# Patient Record
Sex: FEMALE | Race: WHITE | URBAN - METROPOLITAN AREA
[De-identification: names, ages, dates, MRNs, and addresses within clinical notes are randomized per-mention and may not be internally consistent; named-entity substitution may affect disease eponyms.]

---

## 2017-12-19 ENCOUNTER — APPOINTMENT (OUTPATIENT)
Dept: ULTRASOUND IMAGING | Facility: CLINIC | Age: 52
End: 2017-12-19
Attending: EMERGENCY MEDICINE
Payer: COMMERCIAL

## 2017-12-19 ENCOUNTER — HOSPITAL ENCOUNTER (INPATIENT)
Facility: CLINIC | Age: 52
LOS: 3 days | Discharge: HOME | End: 2017-12-22
Attending: EMERGENCY MEDICINE | Admitting: INTERNAL MEDICINE
Payer: COMMERCIAL

## 2017-12-19 DIAGNOSIS — K80.42 CHOLEDOCHOLITHIASIS WITH ACUTE CHOLECYSTITIS: ICD-10-CM

## 2017-12-19 PROBLEM — K81.9 CHOLECYSTITIS: Status: ACTIVE | Noted: 2017-12-19

## 2017-12-19 LAB
ALBUMIN SERPL-MCNC: 3.9 G/DL (ref 3.4–5)
ALP SERPL-CCNC: 254 U/L (ref 40–150)
ALT SERPL W P-5'-P-CCNC: 1228 U/L (ref 0–50)
ANION GAP SERPL CALCULATED.3IONS-SCNC: 11 MMOL/L (ref 3–14)
AST SERPL W P-5'-P-CCNC: 578 U/L (ref 0–45)
BASOPHILS # BLD AUTO: 0 10E9/L (ref 0–0.2)
BASOPHILS NFR BLD AUTO: 0.3 %
BILIRUB SERPL-MCNC: 6.1 MG/DL (ref 0.2–1.3)
BUN SERPL-MCNC: 7 MG/DL (ref 7–30)
CALCIUM SERPL-MCNC: 9.7 MG/DL (ref 8.5–10.1)
CHLORIDE SERPL-SCNC: 98 MMOL/L (ref 94–109)
CO2 SERPL-SCNC: 25 MMOL/L (ref 20–32)
CREAT SERPL-MCNC: 0.49 MG/DL (ref 0.52–1.04)
DIFFERENTIAL METHOD BLD: ABNORMAL
EOSINOPHIL # BLD AUTO: 0.1 10E9/L (ref 0–0.7)
EOSINOPHIL NFR BLD AUTO: 0.4 %
ERYTHROCYTE [DISTWIDTH] IN BLOOD BY AUTOMATED COUNT: 14.7 % (ref 10–15)
GFR SERPL CREATININE-BSD FRML MDRD: >90 ML/MIN/1.7M2
GLUCOSE BLDC GLUCOMTR-MCNC: 70 MG/DL (ref 70–99)
GLUCOSE SERPL-MCNC: 84 MG/DL (ref 70–99)
HCT VFR BLD AUTO: 45.4 % (ref 35–47)
HGB BLD-MCNC: 15 G/DL (ref 11.7–15.7)
IMM GRANULOCYTES # BLD: 0.1 10E9/L (ref 0–0.4)
IMM GRANULOCYTES NFR BLD: 0.4 %
LIPASE SERPL-CCNC: 175 U/L (ref 73–393)
LYMPHOCYTES # BLD AUTO: 0.6 10E9/L (ref 0.8–5.3)
LYMPHOCYTES NFR BLD AUTO: 5 %
MCH RBC QN AUTO: 27.4 PG (ref 26.5–33)
MCHC RBC AUTO-ENTMCNC: 33 G/DL (ref 31.5–36.5)
MCV RBC AUTO: 83 FL (ref 78–100)
MONOCYTES # BLD AUTO: 1 10E9/L (ref 0–1.3)
MONOCYTES NFR BLD AUTO: 8.5 %
NEUTROPHILS # BLD AUTO: 9.9 10E9/L (ref 1.6–8.3)
NEUTROPHILS NFR BLD AUTO: 85.4 %
PLATELET # BLD AUTO: 273 10E9/L (ref 150–450)
PLATELET # BLD AUTO: 310 10E9/L (ref 150–450)
POTASSIUM SERPL-SCNC: 3.7 MMOL/L (ref 3.4–5.3)
PROT SERPL-MCNC: 8 G/DL (ref 6.8–8.8)
RBC # BLD AUTO: 5.47 10E12/L (ref 3.8–5.2)
SODIUM SERPL-SCNC: 134 MMOL/L (ref 133–144)
WBC # BLD AUTO: 11.6 10E9/L (ref 4–11)

## 2017-12-19 PROCEDURE — 96374 THER/PROPH/DIAG INJ IV PUSH: CPT

## 2017-12-19 PROCEDURE — 96361 HYDRATE IV INFUSION ADD-ON: CPT

## 2017-12-19 PROCEDURE — 99285 EMERGENCY DEPT VISIT HI MDM: CPT | Mod: 25

## 2017-12-19 PROCEDURE — 12000000 ZZH R&B MED SURG/OB

## 2017-12-19 PROCEDURE — 83690 ASSAY OF LIPASE: CPT | Performed by: EMERGENCY MEDICINE

## 2017-12-19 PROCEDURE — 76705 ECHO EXAM OF ABDOMEN: CPT

## 2017-12-19 PROCEDURE — 80053 COMPREHEN METABOLIC PANEL: CPT | Performed by: EMERGENCY MEDICINE

## 2017-12-19 PROCEDURE — 00000146 ZZHCL STATISTIC GLUCOSE BY METER IP

## 2017-12-19 PROCEDURE — 96375 TX/PRO/DX INJ NEW DRUG ADDON: CPT

## 2017-12-19 PROCEDURE — 85049 AUTOMATED PLATELET COUNT: CPT | Performed by: INTERNAL MEDICINE

## 2017-12-19 PROCEDURE — 36415 COLL VENOUS BLD VENIPUNCTURE: CPT | Performed by: INTERNAL MEDICINE

## 2017-12-19 PROCEDURE — 25000128 H RX IP 250 OP 636: Performed by: INTERNAL MEDICINE

## 2017-12-19 PROCEDURE — 96376 TX/PRO/DX INJ SAME DRUG ADON: CPT

## 2017-12-19 PROCEDURE — 25000128 H RX IP 250 OP 636: Performed by: EMERGENCY MEDICINE

## 2017-12-19 PROCEDURE — 85025 COMPLETE CBC W/AUTO DIFF WBC: CPT | Performed by: EMERGENCY MEDICINE

## 2017-12-19 PROCEDURE — 99222 1ST HOSP IP/OBS MODERATE 55: CPT | Mod: AI | Performed by: INTERNAL MEDICINE

## 2017-12-19 RX ORDER — LIDOCAINE 40 MG/G
CREAM TOPICAL
Status: DISCONTINUED | OUTPATIENT
Start: 2017-12-19 | End: 2017-12-20 | Stop reason: HOSPADM

## 2017-12-19 RX ORDER — AMOXICILLIN 250 MG
1 CAPSULE ORAL 2 TIMES DAILY PRN
Status: DISCONTINUED | OUTPATIENT
Start: 2017-12-19 | End: 2017-12-22 | Stop reason: HOSPADM

## 2017-12-19 RX ORDER — MORPHINE SULFATE 4 MG/ML
4 INJECTION, SOLUTION INTRAMUSCULAR; INTRAVENOUS
Status: COMPLETED | OUTPATIENT
Start: 2017-12-19 | End: 2017-12-19

## 2017-12-19 RX ORDER — PROCHLORPERAZINE 25 MG
25 SUPPOSITORY, RECTAL RECTAL EVERY 12 HOURS PRN
Status: DISCONTINUED | OUTPATIENT
Start: 2017-12-19 | End: 2017-12-22 | Stop reason: HOSPADM

## 2017-12-19 RX ORDER — POTASSIUM CHLORIDE 29.8 MG/ML
20 INJECTION INTRAVENOUS
Status: DISCONTINUED | OUTPATIENT
Start: 2017-12-19 | End: 2017-12-19 | Stop reason: RX

## 2017-12-19 RX ORDER — INDOMETHACIN 50 MG/1
100 SUPPOSITORY RECTAL
Status: DISCONTINUED | OUTPATIENT
Start: 2017-12-19 | End: 2017-12-20 | Stop reason: HOSPADM

## 2017-12-19 RX ORDER — NALOXONE HYDROCHLORIDE 0.4 MG/ML
.1-.4 INJECTION, SOLUTION INTRAMUSCULAR; INTRAVENOUS; SUBCUTANEOUS
Status: DISCONTINUED | OUTPATIENT
Start: 2017-12-19 | End: 2017-12-22

## 2017-12-19 RX ORDER — ONDANSETRON 4 MG/1
4 TABLET, ORALLY DISINTEGRATING ORAL EVERY 6 HOURS PRN
Status: DISCONTINUED | OUTPATIENT
Start: 2017-12-19 | End: 2017-12-22 | Stop reason: HOSPADM

## 2017-12-19 RX ORDER — HYDROMORPHONE HYDROCHLORIDE 1 MG/ML
.3-.5 INJECTION, SOLUTION INTRAMUSCULAR; INTRAVENOUS; SUBCUTANEOUS
Status: DISCONTINUED | OUTPATIENT
Start: 2017-12-19 | End: 2017-12-22 | Stop reason: HOSPADM

## 2017-12-19 RX ORDER — SODIUM CHLORIDE 9 MG/ML
1000 INJECTION, SOLUTION INTRAVENOUS CONTINUOUS
Status: DISCONTINUED | OUTPATIENT
Start: 2017-12-19 | End: 2017-12-19

## 2017-12-19 RX ORDER — ONDANSETRON 2 MG/ML
4 INJECTION INTRAMUSCULAR; INTRAVENOUS EVERY 6 HOURS PRN
Status: DISCONTINUED | OUTPATIENT
Start: 2017-12-19 | End: 2017-12-22 | Stop reason: HOSPADM

## 2017-12-19 RX ORDER — MORPHINE SULFATE 4 MG/ML
4 INJECTION, SOLUTION INTRAMUSCULAR; INTRAVENOUS
Status: DISCONTINUED | OUTPATIENT
Start: 2017-12-19 | End: 2017-12-19

## 2017-12-19 RX ORDER — POTASSIUM CL/LIDO/0.9 % NACL 10MEQ/0.1L
10 INTRAVENOUS SOLUTION, PIGGYBACK (ML) INTRAVENOUS
Status: DISCONTINUED | OUTPATIENT
Start: 2017-12-19 | End: 2017-12-22 | Stop reason: HOSPADM

## 2017-12-19 RX ORDER — ONDANSETRON 2 MG/ML
4 INJECTION INTRAMUSCULAR; INTRAVENOUS EVERY 30 MIN PRN
Status: DISCONTINUED | OUTPATIENT
Start: 2017-12-19 | End: 2017-12-19

## 2017-12-19 RX ORDER — OXYCODONE HYDROCHLORIDE 5 MG/1
5-10 TABLET ORAL
Status: DISCONTINUED | OUTPATIENT
Start: 2017-12-19 | End: 2017-12-22 | Stop reason: HOSPADM

## 2017-12-19 RX ORDER — CALCIUM CARBONATE 500 MG/1
1-2 TABLET, CHEWABLE ORAL 3 TIMES DAILY PRN
COMMUNITY
End: 2018-03-28

## 2017-12-19 RX ORDER — DIPHENHYDRAMINE HCL 25 MG
25 CAPSULE ORAL EVERY 6 HOURS PRN
Status: DISCONTINUED | OUTPATIENT
Start: 2017-12-19 | End: 2017-12-22 | Stop reason: HOSPADM

## 2017-12-19 RX ORDER — SODIUM CHLORIDE 9 MG/ML
INJECTION, SOLUTION INTRAVENOUS CONTINUOUS
Status: DISCONTINUED | OUTPATIENT
Start: 2017-12-19 | End: 2017-12-20

## 2017-12-19 RX ORDER — POTASSIUM CHLORIDE 1.5 G/1.58G
20-40 POWDER, FOR SOLUTION ORAL
Status: DISCONTINUED | OUTPATIENT
Start: 2017-12-19 | End: 2017-12-22 | Stop reason: HOSPADM

## 2017-12-19 RX ORDER — DIPHENHYDRAMINE HYDROCHLORIDE 50 MG/ML
25 INJECTION INTRAMUSCULAR; INTRAVENOUS ONCE
Status: COMPLETED | OUTPATIENT
Start: 2017-12-19 | End: 2017-12-19

## 2017-12-19 RX ORDER — HYDROMORPHONE HYDROCHLORIDE 1 MG/ML
0.5 INJECTION, SOLUTION INTRAMUSCULAR; INTRAVENOUS; SUBCUTANEOUS
Status: DISCONTINUED | OUTPATIENT
Start: 2017-12-19 | End: 2017-12-19 | Stop reason: CLARIF

## 2017-12-19 RX ORDER — POTASSIUM CHLORIDE 7.45 MG/ML
10 INJECTION INTRAVENOUS
Status: DISCONTINUED | OUTPATIENT
Start: 2017-12-19 | End: 2017-12-22 | Stop reason: HOSPADM

## 2017-12-19 RX ORDER — ACETAMINOPHEN 325 MG/1
650 TABLET ORAL EVERY 4 HOURS PRN
Status: DISCONTINUED | OUTPATIENT
Start: 2017-12-19 | End: 2017-12-22 | Stop reason: HOSPADM

## 2017-12-19 RX ORDER — DIPHENHYDRAMINE HYDROCHLORIDE 50 MG/ML
25 INJECTION INTRAMUSCULAR; INTRAVENOUS EVERY 6 HOURS PRN
Status: DISCONTINUED | OUTPATIENT
Start: 2017-12-19 | End: 2017-12-22 | Stop reason: HOSPADM

## 2017-12-19 RX ORDER — PROCHLORPERAZINE MALEATE 5 MG
10 TABLET ORAL EVERY 6 HOURS PRN
Status: DISCONTINUED | OUTPATIENT
Start: 2017-12-19 | End: 2017-12-22 | Stop reason: HOSPADM

## 2017-12-19 RX ORDER — AMOXICILLIN 250 MG
2 CAPSULE ORAL 2 TIMES DAILY PRN
Status: DISCONTINUED | OUTPATIENT
Start: 2017-12-19 | End: 2017-12-22 | Stop reason: HOSPADM

## 2017-12-19 RX ORDER — POTASSIUM CHLORIDE 1500 MG/1
20-40 TABLET, EXTENDED RELEASE ORAL
Status: DISCONTINUED | OUTPATIENT
Start: 2017-12-19 | End: 2017-12-22 | Stop reason: HOSPADM

## 2017-12-19 RX ADMIN — MORPHINE SULFATE 4 MG: 4 INJECTION, SOLUTION INTRAMUSCULAR; INTRAVENOUS at 16:44

## 2017-12-19 RX ADMIN — DIPHENHYDRAMINE HYDROCHLORIDE 25 MG: 50 INJECTION, SOLUTION INTRAMUSCULAR; INTRAVENOUS at 18:49

## 2017-12-19 RX ADMIN — SODIUM CHLORIDE: 9 INJECTION, SOLUTION INTRAVENOUS at 20:51

## 2017-12-19 RX ADMIN — MORPHINE SULFATE 4 MG: 4 INJECTION, SOLUTION INTRAMUSCULAR; INTRAVENOUS at 17:55

## 2017-12-19 RX ADMIN — SODIUM CHLORIDE 1000 ML: 9 INJECTION, SOLUTION INTRAVENOUS at 16:20

## 2017-12-19 RX ADMIN — Medication 0.5 MG: at 20:49

## 2017-12-19 RX ADMIN — ONDANSETRON 4 MG: 2 INJECTION INTRAMUSCULAR; INTRAVENOUS at 16:21

## 2017-12-19 RX ADMIN — MORPHINE SULFATE 4 MG: 4 INJECTION, SOLUTION INTRAMUSCULAR; INTRAVENOUS at 16:21

## 2017-12-19 RX ADMIN — ONDANSETRON 4 MG: 2 INJECTION INTRAMUSCULAR; INTRAVENOUS at 20:59

## 2017-12-19 ASSESSMENT — ENCOUNTER SYMPTOMS
ABDOMINAL PAIN: 1
VOMITING: 1
NAUSEA: 1
CONSTIPATION: 1
FEVER: 0

## 2017-12-19 NOTE — IP AVS SNAPSHOT
[facility] [facility]    [address] [place] [address]    Phone:  [phone]                                       After Visit Summary   12/19/2017    Humera Grey    MRN: [id number]           After Visit Summary Signature Page     I have received my discharge instructions, and my questions have been answered. I have discussed any challenges I see with this plan with the nurse or doctor.    ..........................................................................................................................................  Patient/Patient Representative Signature      ..........................................................................................................................................  Patient Representative Print Name and Relationship to Patient    ..................................................               ................................................  Date                                            Time    ..........................................................................................................................................  Reviewed by Signature/Title    ...................................................              ..............................................  Date                                                            Time

## 2017-12-19 NOTE — ED NOTES
DATE:  12/19/2017   TIME OF RECEIPT FROM LAB:  3208  LAB TEST:  AST AND ALT     LAB VALUE:  578  And 1228  RESULTS GIVEN WITH READ-BACK TO (PROVIDER):  Data UnavailableDr Bergenstal  TIME LAB VALUE REPORTED TO PROVIDER:   1701

## 2017-12-19 NOTE — ED PROVIDER NOTES
History     Chief Complaint:  Abdominal Pain    HPI   Humera Grey is a 52 year old female who presents after a positive diagnosis of gall stones on 12/18 at her clinic and continued uncontrolled abdominal pain today. The patient reports that she followed up with GI, who scheduled the procedure as an outpatient; however, the patient presents to the ED today for uncontrolled abdominal pain at a severity of 7/10 and one episode of vomiting. She denies fever. The patient notes decreased bowel movements with her last stool 4 days ago but states that this has been chronic over the past 6 months. She notes no provocation or palliation of her pain with food, although she notes only having a quarter cup of soup and water today.    Clinic CT 12/18/17  CT-scan Abdomen/Pelvis w/o contrast:  1. Abnormal appearance of the gallbladder, likely related to sludge and multiple noncalcified gallstones. No appreciable pericholecystic edema, although acute cholecystitis is not excluded. The CBD is mildly prominent, which is a nonspecific finding. Ultrasound may be of further benefit to evaluate these findings.  2.  Markedly decreased attenuation of the liver parenchyma, consistent with fatty infiltration.  3. Incidental findings as described above.  Preliminary result per radiology.      Laboratory:  ALKPHOS 180  ALT 1208  AST: 844    Allergies:  Sertraline      Medications:    The patient is currently on no regular medications.      Past Medical History:    Depression  GERD    Past Surgical History:    Cystectomy ovarian  Mastectomy  Toe surgery    Family History:    History review. No contributing family history.     Social History:  Smoking status: no  Alcohol use: yes   PCP: No primary care provider on file.   Patient presents with .   Marital Status:       Review of Systems   Constitutional: Negative for fever.   Gastrointestinal: Positive for abdominal pain, constipation (chronic), nausea and vomiting.   All  other systems reviewed and are negative.    Physical Exam     Patient Vitals for the past 24 hrs:   BP Temp Temp src Pulse SpO2 Height Weight   12/19/17 1744 142/80 - - - 96 % - -   12/19/17 1730 - - - - 95 % - -   12/19/17 1645 174/83 - - - - - -   12/19/17 1630 - - - - 98 % - -   12/19/17 1540 (!) 175/99 97.8  F (36.6  C) Oral 99 100 % 1.524 m (5') 62.6 kg (138 lb)     Physical Exam  General: Appears well-developed and well-nourished.   Head: No signs of trauma.   CV: Normal rate and regular rhythm.    Resp: Effort normal and breath sounds normal. No respiratory distress.   GI: Soft. There is RUQ tenderness without rebound or guarding.  Normal bowel sounds.  No CVA tenderness.  MSK: Normal range of motion. no edema. No Calf tenderness.  Neuro: The patient is alert and oriented.  Speech normal.  GCS 15  Skin: Skin is warm and dry. No rash noted.   Psych: normal mood and affect. behavior is normal.       Emergency Department Course     Imaging:  Radiographic findings were communicated with the patient who voiced understanding of the findings.    US Abdomen RUQ:  1. Cholelithiasis filling the gallbladder. There is gallbladder wall  thickness upper normal at 0.3 cm. Positive sonographic Jules's sign.  With tenderness over the gallbladder, early cholecystitis cannot be  excluded although gallbladder wall is only borderline thickened.  2. Prominent 0.7 cm common hepatic duct.  3. Fatty infiltration of the liver.  As read by radiology    Laboratory:  CBC: WBC 11.6 (H), o/w WNL (HGB 15.0,  )   CMP: Creatinine 0.49 (L), Bilirubin 6.1 (H), ALKPHOS 254 (H), ALT 1228 (HH),  (HH), o/w WNL  Lipase: 175    Interventions:  1620: NS 1L IV Bolus   1621: Zofran 4mg IV   1621: Morphine 4 mg IV   1755: Morphine 4 mg IV  1849: Benadryl 25 mg IV    Emergency Department Course:  Past medical records, nursing notes, and vitals reviewed.  1610: I performed an exam of the patient and obtained history, as documented above. GCS  15.   The patient was taken for US, see imaging results above.    IV inserted and blood drawn.   1730: I discussed the patient with SHANIA Johnson.  Findings and plan explained to the Patient who consents to admission.     1848: Discussed the patient with Dr. Lainez, who will admit the patient to a medical bed for further monitoring, evaluation, and treatment.      Impression & Plan      Medical Decision Making:  Humera Grey is a 52-year-old woman who presents due to abdominal pain.  She has had the pain off and on for some time, but worse the last couple of days.  She had been seen by Dr. Leonard with gastroenterology yesterday who did blood work and a CT, and she was thought to have gallbladder disease.  Given the worsening pain, she came to the ER.  On my evaluation, she did appear uncomfortable and some tenderness of the right upper quadrant.  Blood work was repeated which showed an elevation of her LFTs.  Ultrasound also showed signs of cholecystitis.  Based on her overall picture, believe that she has choledocholithiasis.  Dr. Leonard with GI actually came down and saw the patient in the ER and agreed to do an ERCP in the morning to either treat choledocholithiasis or clear the patient for potential surgery if indicated.  Patient was admitted to the hospitalist service.      Diagnosis:    ICD-10-CM    1. Choledocholithiasis with acute cholecystitis K80.42        Disposition:  Admitted to markos Nix  12/19/2017    EMERGENCY DEPARTMENT  I, Ashly Nix, am serving as a scribe at 4:10 PM on 12/19/2017 to document services personally performed by Oscar Ortiz MD based on my observations and the provider's statements to me.        Oscar Ortiz MD  12/19/17 5313

## 2017-12-19 NOTE — IP AVS SNAPSHOT
MRN:[id number]                      After Visit Summary   12/19/2017    Humera Grey    MRN: [id number]           Thank you!     Thank you for choosing [facility] for your care. Our goal is always to provide you with excellent care. Hearing back from our patients is one way we can continue to improve our services. Please take a few minutes to complete the written survey that you may receive in the mail after you visit with us. Thank you!        Patient Information     Date Of Birth          1965        Designated Caregiver       Most Recent Value    Caregiver    Will someone help with your care after discharge? yes    Name of designated caregiver Justin    Phone number of caregiver [phone]    Caregiver address see Facesheet      About your hospital stay     You were admitted on:  December 19, 2017 You last received care in the:  [facility] [address] [facility]    You were discharged on:  December 22, 2017        Reason for your hospital stay       You had your gallbladder removed            Reason for your hospital stay       You were admitted with abdominal pain and had surgery to remove the gall bladder                  Who to Call     For medical emergencies, please call 911.  For non-urgent questions about your medical care, please call your primary care provider or clinic, [phone]  For questions related to your surgery, please call your surgery clinic        Attending Provider     Provider Specialty    Oscar Ortiz MD --    Yuri Lainez MD Internal Medicine       Primary Care Provider Office Phone # Fax #    Majo Velez [phone]       When to contact your care team       Call your primary doctor if you have any of the following: temperature greater than 101 and having abdominal pain.                  After Care Instructions     Activity       Your activity upon discharge: activity as tolerated and no heavy lifting for 2 weeks             Activity       Your activity upon discharge: activity as tolerated.  Do not drive while taking narcotics            Diet       Follow this diet upon discharge: Regular            Diet       Follow this diet upon discharge: Orders Placed This Encounter      Advance Diet as Tolerated: Regular Diet Adult                  Follow-up Appointments     Follow-up and recommended labs and tests        Follow up with Dr. Brenner or PA, at [address], [place] [address], within 2 weeks, call office for appointment at [phone].    IF you are doing well and have no questions or concerns, you may call our nurse to update on your condition instead of coming in for appointment.            Follow-up and recommended labs and tests        Follow up with primary care provider, JOAN BARBOZA, within 7 days for hospital follow- up.  The following labs/tests are recommended: liver function tests.                  Further instructions from your care team       Aggressive incentives spirometry  Ambulate as tolerated at least 3 to 4 times a day  Repeat ERCP in 3 months to remove stent.       Pending Results     No orders found from 12/17/2017 to 12/20/2017.            Statement of Approval     Ordered          12/22/17 1421  I have reviewed and agree with all the recommendations and orders detailed in this document.  EFFECTIVE NOW     Approved and electronically signed by:  Payton Gorman MD             Admission Information     Date & Time Provider Department Dept. Phone    12/19/2017 Yuri Lainez MD [facility] [address] [facility] [phone]      Your Vitals Were     Blood Pressure Pulse Temperature Respirations Height Weight    132/89 (BP Location: Left arm) 92 98  F (36.7  C) (Oral) 18 1.524 m (5') 62.6 kg (138 lb)    Pulse Oximetry BMI (Body Mass Index)                95% 26.95 kg/m2          MyChart Information     [facility] lets you send messages to your doctor, view your test results,  "renew your prescriptions, schedule appointments and more. To sign up, go to [email or web address] . Click on \"Log in\" on the left side of the screen, which will take you to the Welcome page. Then click on \"Sign up Now\" on the right side of the page.     You will be asked to enter the access code listed below, as well as some personal information. Please follow the directions to create your username and password.     Your access code is: [email or web address]  Expires: 3/22/2018 12:46 PM     Your access code will  in 90 days. If you need help or a new code, please call your [facility] clinic or [phone].        Care EveryWhere ID     This is your Care EveryWhere ID. This could be used by other organizations to access your [facility] medical records  [id number]        Equal Access to Services     DEREK CASTILLO : Shasha Avilez, derrek peng, saira rayo, loyda patton . So [facility] [phone].    ATENCIÓN: Si habla español, tiene a dye disposición servicios gratuitos de asistencia lingüística. Llame al [phone].    We comply with applicable federal civil rights laws and [place] laws. We do not discriminate on the basis of race, color, national origin, age, disability, sex, sexual orientation, or gender identity.               Review of your medicines      START taking        Dose / Directions    bisacodyl 10 MG Suppository   Commonly known as:  DULCOLAX   Used for:  Choledocholithiasis with acute cholecystitis        Dose:  10 mg   Place 1 suppository (10 mg) rectally daily as needed for constipation   Quantity:  1 suppository   Refills:  1       oxyCODONE IR 5 MG tablet   Commonly known as:  ROXICODONE   Used for:  Choledocholithiasis with acute cholecystitis        Dose:  5-10 mg   Take 1-2 tablets (5-10 mg) by mouth every 3 hours as needed for moderate to severe pain   Quantity:  20 tablet   Refills:  0         CONTINUE these medicines which have NOT " CHANGED        Dose / Directions    ADVIL PO        Dose:  200-400 mg   Take 200-400 mg by mouth every 6 hours as needed for moderate pain   Refills:  0       bismuth subsalicylate 262 MG/15ML suspension   Commonly known as:  PEPTO BISMOL        Dose:  15 mL   Take 15 mLs by mouth every 6 hours as needed for indigestion   Refills:  0       calcium carbonate 500 MG chewable tablet   Commonly known as:  TUMS        Dose:  1-2 chew tab   Take 1-2 chew tab by mouth 3 times daily as needed for heartburn   Refills:  0         STOP taking     PREDNISONE PO                Where to get your medicines      These medications were sent to [id number] - [place], [place] - [address]  [address] [facility] [address]     Phone:  [phone]     bisacodyl 10 MG Suppository         Some of these will need a paper prescription and others can be bought over the counter. Ask your nurse if you have questions.     Bring a paper prescription for each of these medications     oxyCODONE IR 5 MG tablet                Protect others around you: Learn how to safely use, store and throw away your medicines at www.disposemymeds.org.             Medication List: This is a list of all your medications and when to take them. Check marks below indicate your daily home schedule. Keep this list as a reference.      Medications           Morning Afternoon Evening Bedtime As Needed    ADVIL PO   Take 200-400 mg by mouth every 6 hours as needed for moderate pain   Next Dose Due:  Start at home when needed for moderated pain.                                 bisacodyl 10 MG Suppository   Commonly known as:  DULCOLAX   Place 1 suppository (10 mg) rectally daily as needed for constipation   Next Dose Due:  If needed for constipation.                                 bismuth subsalicylate 262 MG/15ML suspension   Commonly known as:  PEPTO BISMOL   Take 15 mLs by mouth every 6 hours as needed for indigestion   Next Dose Due:  If needed for  indigestion.                                calcium carbonate 500 MG chewable tablet   Commonly known as:  TUMS   Take 1-2 chew tab by mouth 3 times daily as needed for heartburn   Next Dose Due:  If needed for heartburn.                                oxyCODONE IR 5 MG tablet   Commonly known as:  ROXICODONE   Take 1-2 tablets (5-10 mg) by mouth every 3 hours as needed for moderate to severe pain   Last time this was given:  10 mg on 12/22/2017  6:19 AM   Next Dose Due:  If needed for moderated to severe pain.

## 2017-12-19 NOTE — CONSULTS
[facility]  Gastroenterology Consultation         Humera Grey  [address]  [facility] [address]  52 year old female    Admission Date/Time: 12/19/2017  Primary Care Provider: Majo Velez  Referring / Attending Physician:  Dr. Lainez    We were asked to see the patient in consultation by Dr. Lainez for evaluation of Abdominal pain.        CC: Jaundice    HPI:  Humera Grey is a 52 year old female who was admitted through ED due to sever RUQ abdominal pain with dilated CBD and Multiple gallstones. Patient is tender RUQ area. Significantly elevated LFTs. Patient was seen in the clinic earlier and was sent to ER. Patient has been c/o  abdominal pain off and on with eating for almost 2-3 weeks. Pain became significantly worse yesterday with nausea and vomiting. Patient was evaluated with CT and blood work as mentioned earlier.  No H/O fever, chills, chest pain, SOB.  No H/O travel or exposure to anyone with jaundice.    ROS: A comprehensive ten point review of systems was negative aside from those in mentioned in the HPI.      PAST MED HX:  I have reviewed this patient's medical history and updated it with pertinent information if needed.   Past Medical History:   Diagnosis Date     Depressive disorder      Gastroesophageal reflux disease        MEDICATIONS:   None       ALLERGIES:   Allergies   Allergen Reactions     Sertraline        SOCIAL HISTORY:  Social History   Substance Use Topics     Smoking status: Never Smoker     Smokeless tobacco: Not on file     Alcohol use Yes      Comment: 5 times a week       FAMILY HISTORY:  No family history on file.    PHYSICAL EXAM:   General  Awake, alert, oriented  Vital Signs with Ranges  Temp: 97.8  F (36.6  C) Temp src: Oral BP: 174/83 Pulse: 99     SpO2: 98 % O2 Device: None (Room air)         Constitutional: healthy, alert and no distress   Cardiovascular: negative, PMI normal. No lifts, heaves, or thrills. RRR. No murmurs, clicks gallops or  rub  Respiratory: negative, Percussion normal. Good diaphragmatic excursion. Lungs clear  Head: Normocephalic. No masses, lesions, tenderness or abnormalities  Neck: Neck supple. No adenopathy. Thyroid symmetric, normal size,, Carotids without bruits.  Abdomen: Abdomen soft, tender RUQ area.. BS normal. No masses, organomegaly          ADDITIONAL COMMENTS:   I reviewed the patient's new clinical lab test results.   Recent Labs   Lab Test  12/19/17   1610   WBC  11.6*   HGB  15.0   MCV  83   PLT  310     Recent Labs   Lab Test  12/19/17   1610   POTASSIUM  3.7   CHLORIDE  98   CO2  25   BUN  7   ANIONGAP  11     Recent Labs   Lab Test  12/19/17   1610   ALBUMIN  3.9   BILITOTAL  6.1*   ALT  1228*   AST  578*   LIPASE  175       I reviewed the patient's new imaging results.        CONSULTATION ASSESSMENT AND PLAN:    Active Problems:  Acute Cholecystitis. Suspected Choledocholithiasis.  Jaundice. Worsening of LFTS.  Less likely acute hepatitis  NPO  I/V pain control  EUS and ERCP tomorrow.  U/S abdomen today.  Surgical consult.                Usman Leonard MD, FACP  [facility].  Office: [phone]  Cell : [phone]

## 2017-12-19 NOTE — ED NOTES
[facility]  ED Nurse Handoff Report    ED Chief complaint: Abdominal Pain (right upper quadrant abd pain for 3 days  with nausea)      ED Diagnosis:   Final diagnoses:   None       Code Status: Full Code    Allergies:   Allergies   Allergen Reactions     Sertraline        Activity level - Baseline/Home:  Independent    Activity Level - Current:   Independent     Needed?: No    Isolation: No  Infection: Not Applicable    Bariatric?: No    Vital Signs:   Vitals:    12/19/17 1540 12/19/17 1630 12/19/17 1645   BP: (!) 175/99  174/83   Pulse: 99     Temp: 97.8  F (36.6  C)     TempSrc: Oral     SpO2: 100% 98%    Weight: 62.6 kg (138 lb)     Height: 1.524 m (5')         Cardiac Rhythm: ,        Pain level: 0-10 Pain Scale: 5    Is this patient confused?: No    Patient Report: Initial Complaint: [facility] [place] [facility] with c/o of and and off RUQ abd pain.  Labs and CT done.  Followed up with GI today and told she has gallstones and was going to have brenton done.  Pain got severe today and came in here needs brenton  Focused Assessment: see above  Tests Performed: see epic  Abnormal Results: see epic  Treatments provided: fluids, zofran and morphine    Family Comments:  with pt    OBS brochure/video discussed/provided to patient: N/A    ED Medications:   Medications   0.9% sodium chloride BOLUS (1,000 mLs Intravenous New Bag 12/19/17 1620)     Followed by   0.9% sodium chloride infusion (not administered)   ondansetron (ZOFRAN) injection 4 mg (4 mg Intravenous Given 12/19/17 1621)   morphine (PF) injection 4 mg (4 mg Intravenous Given 12/19/17 1755)   morphine (PF) injection 4 mg (4 mg Intravenous Given 12/19/17 1621)       Drips infusing?:  Yes      ED NURSE PHONE NUMBER: *[phone]

## 2017-12-20 ENCOUNTER — ANESTHESIA (OUTPATIENT)
Dept: SURGERY | Facility: CLINIC | Age: 52
End: 2017-12-20
Payer: COMMERCIAL

## 2017-12-20 ENCOUNTER — ANESTHESIA EVENT (OUTPATIENT)
Dept: SURGERY | Facility: CLINIC | Age: 52
End: 2017-12-20
Payer: COMMERCIAL

## 2017-12-20 ENCOUNTER — APPOINTMENT (OUTPATIENT)
Dept: GENERAL RADIOLOGY | Facility: CLINIC | Age: 52
End: 2017-12-20
Attending: INTERNAL MEDICINE
Payer: COMMERCIAL

## 2017-12-20 LAB
ALBUMIN SERPL-MCNC: 2.9 G/DL (ref 3.4–5)
ALP SERPL-CCNC: 227 U/L (ref 40–150)
ALT SERPL W P-5'-P-CCNC: 856 U/L (ref 0–50)
ANION GAP SERPL CALCULATED.3IONS-SCNC: 9 MMOL/L (ref 3–14)
AST SERPL W P-5'-P-CCNC: 466 U/L (ref 0–45)
BILIRUB DIRECT SERPL-MCNC: 3.4 MG/DL (ref 0–0.2)
BILIRUB SERPL-MCNC: 4.4 MG/DL (ref 0.2–1.3)
BUN SERPL-MCNC: 13 MG/DL (ref 7–30)
CALCIUM SERPL-MCNC: 8.3 MG/DL (ref 8.5–10.1)
CHLORIDE SERPL-SCNC: 109 MMOL/L (ref 94–109)
CK SERPL-CCNC: 33 U/L (ref 30–225)
CO2 SERPL-SCNC: 22 MMOL/L (ref 20–32)
CREAT SERPL-MCNC: 0.42 MG/DL (ref 0.52–1.04)
ERCP: NORMAL
ERYTHROCYTE [DISTWIDTH] IN BLOOD BY AUTOMATED COUNT: 15 % (ref 10–15)
GFR SERPL CREATININE-BSD FRML MDRD: >90 ML/MIN/1.7M2
GGT SERPL-CCNC: 682 U/L (ref 0–40)
GLUCOSE BLDC GLUCOMTR-MCNC: 58 MG/DL (ref 70–99)
GLUCOSE BLDC GLUCOMTR-MCNC: 74 MG/DL (ref 70–99)
GLUCOSE BLDC GLUCOMTR-MCNC: 96 MG/DL (ref 70–99)
GLUCOSE SERPL-MCNC: 68 MG/DL (ref 70–99)
HCG UR QL: NEGATIVE
HCT VFR BLD AUTO: 38.1 % (ref 35–47)
HGB BLD-MCNC: 12.2 G/DL (ref 11.7–15.7)
LDH SERPL L TO P-CCNC: 304 U/L (ref 81–234)
MCH RBC QN AUTO: 27.9 PG (ref 26.5–33)
MCHC RBC AUTO-ENTMCNC: 32 G/DL (ref 31.5–36.5)
MCV RBC AUTO: 87 FL (ref 78–100)
PLATELET # BLD AUTO: 249 10E9/L (ref 150–450)
POTASSIUM SERPL-SCNC: 3.9 MMOL/L (ref 3.4–5.3)
PROT SERPL-MCNC: 6.2 G/DL (ref 6.8–8.8)
RBC # BLD AUTO: 4.37 10E12/L (ref 3.8–5.2)
SODIUM SERPL-SCNC: 140 MMOL/L (ref 133–144)
WBC # BLD AUTO: 8.5 10E9/L (ref 4–11)

## 2017-12-20 PROCEDURE — 83615 LACTATE (LD) (LDH) ENZYME: CPT | Performed by: INTERNAL MEDICINE

## 2017-12-20 PROCEDURE — 80076 HEPATIC FUNCTION PANEL: CPT | Performed by: INTERNAL MEDICINE

## 2017-12-20 PROCEDURE — 81025 URINE PREGNANCY TEST: CPT | Performed by: ANESTHESIOLOGY

## 2017-12-20 PROCEDURE — 25000128 H RX IP 250 OP 636: Performed by: NURSE ANESTHETIST, CERTIFIED REGISTERED

## 2017-12-20 PROCEDURE — C1887 CATHETER, GUIDING: HCPCS | Performed by: INTERNAL MEDICINE

## 2017-12-20 PROCEDURE — 25000128 H RX IP 250 OP 636: Performed by: INTERNAL MEDICINE

## 2017-12-20 PROCEDURE — 27210286 ZZH BALLOON ADDITIONAL: Performed by: INTERNAL MEDICINE

## 2017-12-20 PROCEDURE — 85027 COMPLETE CBC AUTOMATED: CPT | Performed by: INTERNAL MEDICINE

## 2017-12-20 PROCEDURE — 36415 COLL VENOUS BLD VENIPUNCTURE: CPT | Performed by: INTERNAL MEDICINE

## 2017-12-20 PROCEDURE — 37000009 ZZH ANESTHESIA TECHNICAL FEE, EACH ADDTL 15 MIN: Performed by: INTERNAL MEDICINE

## 2017-12-20 PROCEDURE — 12000000 ZZH R&B MED SURG/OB

## 2017-12-20 PROCEDURE — 40000799 ZZH STATISTIC EUS (OR PROCEDURE): Performed by: INTERNAL MEDICINE

## 2017-12-20 PROCEDURE — C1769 GUIDE WIRE: HCPCS | Performed by: INTERNAL MEDICINE

## 2017-12-20 PROCEDURE — 0F798DZ DILATION OF COMMON BILE DUCT WITH INTRALUMINAL DEVICE, VIA NATURAL OR ARTIFICIAL OPENING ENDOSCOPIC: ICD-10-PCS | Performed by: INTERNAL MEDICINE

## 2017-12-20 PROCEDURE — 82977 ASSAY OF GGT: CPT | Performed by: INTERNAL MEDICINE

## 2017-12-20 PROCEDURE — 37000008 ZZH ANESTHESIA TECHNICAL FEE, 1ST 30 MIN: Performed by: INTERNAL MEDICINE

## 2017-12-20 PROCEDURE — 82550 ASSAY OF CK (CPK): CPT | Performed by: INTERNAL MEDICINE

## 2017-12-20 PROCEDURE — 25800025 ZZH RX 258: Performed by: INTERNAL MEDICINE

## 2017-12-20 PROCEDURE — 25000128 H RX IP 250 OP 636: Performed by: HOSPITALIST

## 2017-12-20 PROCEDURE — 99233 SBSQ HOSP IP/OBS HIGH 50: CPT | Performed by: HOSPITALIST

## 2017-12-20 PROCEDURE — 0FC98ZZ EXTIRPATION OF MATTER FROM COMMON BILE DUCT, VIA NATURAL OR ARTIFICIAL OPENING ENDOSCOPIC: ICD-10-PCS | Performed by: INTERNAL MEDICINE

## 2017-12-20 PROCEDURE — 99253 IP/OBS CNSLTJ NEW/EST LOW 45: CPT | Mod: 51 | Performed by: SURGERY

## 2017-12-20 PROCEDURE — 0F7D8ZZ DILATION OF PANCREATIC DUCT, VIA NATURAL OR ARTIFICIAL OPENING ENDOSCOPIC: ICD-10-PCS | Performed by: INTERNAL MEDICINE

## 2017-12-20 PROCEDURE — 74330 X-RAY BILE/PANC ENDOSCOPY: CPT

## 2017-12-20 PROCEDURE — 36000056 ZZH SURGERY LEVEL 3 1ST 30 MIN: Performed by: INTERNAL MEDICINE

## 2017-12-20 PROCEDURE — 0F788ZZ DILATION OF CYSTIC DUCT, VIA NATURAL OR ARTIFICIAL OPENING ENDOSCOPIC: ICD-10-PCS | Performed by: INTERNAL MEDICINE

## 2017-12-20 PROCEDURE — 25000125 ZZHC RX 250: Performed by: NURSE ANESTHETIST, CERTIFIED REGISTERED

## 2017-12-20 PROCEDURE — C2617 STENT, NON-COR, TEM W/O DEL: HCPCS | Performed by: INTERNAL MEDICINE

## 2017-12-20 PROCEDURE — 00000146 ZZHCL STATISTIC GLUCOSE BY METER IP

## 2017-12-20 PROCEDURE — 25000128 H RX IP 250 OP 636: Performed by: ANESTHESIOLOGY

## 2017-12-20 PROCEDURE — 40000067 ZZH STATISTIC ERCP (OR PROCEDURE): Performed by: INTERNAL MEDICINE

## 2017-12-20 PROCEDURE — 40000170 ZZH STATISTIC PRE-PROCEDURE ASSESSMENT II: Performed by: INTERNAL MEDICINE

## 2017-12-20 PROCEDURE — 71000012 ZZH RECOVERY PHASE 1 LEVEL 1 FIRST HR: Performed by: INTERNAL MEDICINE

## 2017-12-20 PROCEDURE — 80048 BASIC METABOLIC PNL TOTAL CA: CPT | Performed by: INTERNAL MEDICINE

## 2017-12-20 PROCEDURE — 25000125 ZZHC RX 250: Performed by: HOSPITALIST

## 2017-12-20 RX ORDER — DEXTROSE MONOHYDRATE, SODIUM CHLORIDE, AND POTASSIUM CHLORIDE 50; 1.49; 9 G/1000ML; G/1000ML; G/1000ML
INJECTION, SOLUTION INTRAVENOUS CONTINUOUS
Status: DISCONTINUED | OUTPATIENT
Start: 2017-12-20 | End: 2017-12-21

## 2017-12-20 RX ORDER — SODIUM CHLORIDE, SODIUM LACTATE, POTASSIUM CHLORIDE, CALCIUM CHLORIDE 600; 310; 30; 20 MG/100ML; MG/100ML; MG/100ML; MG/100ML
INJECTION, SOLUTION INTRAVENOUS CONTINUOUS
Status: DISCONTINUED | OUTPATIENT
Start: 2017-12-20 | End: 2017-12-20 | Stop reason: HOSPADM

## 2017-12-20 RX ORDER — IOPAMIDOL 612 MG/ML
INJECTION, SOLUTION INTRAVASCULAR PRN
Status: DISCONTINUED | OUTPATIENT
Start: 2017-12-20 | End: 2017-12-20 | Stop reason: HOSPADM

## 2017-12-20 RX ORDER — HYDROMORPHONE HYDROCHLORIDE 1 MG/ML
.3-.5 INJECTION, SOLUTION INTRAMUSCULAR; INTRAVENOUS; SUBCUTANEOUS EVERY 5 MIN PRN
Status: DISCONTINUED | OUTPATIENT
Start: 2017-12-20 | End: 2017-12-20 | Stop reason: HOSPADM

## 2017-12-20 RX ORDER — SODIUM CHLORIDE AND POTASSIUM CHLORIDE 150; 900 MG/100ML; MG/100ML
INJECTION, SOLUTION INTRAVENOUS CONTINUOUS
Status: DISCONTINUED | OUTPATIENT
Start: 2017-12-20 | End: 2017-12-20

## 2017-12-20 RX ORDER — FENTANYL CITRATE 50 UG/ML
INJECTION, SOLUTION INTRAMUSCULAR; INTRAVENOUS PRN
Status: DISCONTINUED | OUTPATIENT
Start: 2017-12-20 | End: 2017-12-20

## 2017-12-20 RX ORDER — LABETALOL HYDROCHLORIDE 5 MG/ML
10 INJECTION, SOLUTION INTRAVENOUS
Status: DISCONTINUED | OUTPATIENT
Start: 2017-12-20 | End: 2017-12-20 | Stop reason: HOSPADM

## 2017-12-20 RX ORDER — HYDRALAZINE HYDROCHLORIDE 20 MG/ML
2.5-5 INJECTION INTRAMUSCULAR; INTRAVENOUS EVERY 10 MIN PRN
Status: DISCONTINUED | OUTPATIENT
Start: 2017-12-20 | End: 2017-12-20 | Stop reason: HOSPADM

## 2017-12-20 RX ORDER — NALOXONE HYDROCHLORIDE 0.4 MG/ML
.1-.4 INJECTION, SOLUTION INTRAMUSCULAR; INTRAVENOUS; SUBCUTANEOUS
Status: ACTIVE | OUTPATIENT
Start: 2017-12-20 | End: 2017-12-21

## 2017-12-20 RX ORDER — MEPERIDINE HYDROCHLORIDE 25 MG/ML
12.5 INJECTION INTRAMUSCULAR; INTRAVENOUS; SUBCUTANEOUS EVERY 5 MIN PRN
Status: DISCONTINUED | OUTPATIENT
Start: 2017-12-20 | End: 2017-12-20 | Stop reason: HOSPADM

## 2017-12-20 RX ORDER — CEFAZOLIN SODIUM 2 G/100ML
2 INJECTION, SOLUTION INTRAVENOUS
Status: COMPLETED | OUTPATIENT
Start: 2017-12-20 | End: 2017-12-21

## 2017-12-20 RX ORDER — DEXAMETHASONE SODIUM PHOSPHATE 4 MG/ML
INJECTION, SOLUTION INTRA-ARTICULAR; INTRALESIONAL; INTRAMUSCULAR; INTRAVENOUS; SOFT TISSUE PRN
Status: DISCONTINUED | OUTPATIENT
Start: 2017-12-20 | End: 2017-12-20

## 2017-12-20 RX ORDER — ONDANSETRON 2 MG/ML
4 INJECTION INTRAMUSCULAR; INTRAVENOUS EVERY 30 MIN PRN
Status: DISCONTINUED | OUTPATIENT
Start: 2017-12-20 | End: 2017-12-20 | Stop reason: HOSPADM

## 2017-12-20 RX ORDER — ONDANSETRON 4 MG/1
4 TABLET, ORALLY DISINTEGRATING ORAL EVERY 30 MIN PRN
Status: DISCONTINUED | OUTPATIENT
Start: 2017-12-20 | End: 2017-12-20 | Stop reason: HOSPADM

## 2017-12-20 RX ORDER — FENTANYL CITRATE 50 UG/ML
25-50 INJECTION, SOLUTION INTRAMUSCULAR; INTRAVENOUS
Status: DISCONTINUED | OUTPATIENT
Start: 2017-12-20 | End: 2017-12-20 | Stop reason: HOSPADM

## 2017-12-20 RX ORDER — PROPOFOL 10 MG/ML
INJECTION, EMULSION INTRAVENOUS CONTINUOUS PRN
Status: DISCONTINUED | OUTPATIENT
Start: 2017-12-20 | End: 2017-12-20

## 2017-12-20 RX ORDER — ALBUTEROL SULFATE 0.83 MG/ML
2.5 SOLUTION RESPIRATORY (INHALATION) EVERY 4 HOURS PRN
Status: DISCONTINUED | OUTPATIENT
Start: 2017-12-20 | End: 2017-12-20 | Stop reason: HOSPADM

## 2017-12-20 RX ORDER — SODIUM CHLORIDE, SODIUM LACTATE, POTASSIUM CHLORIDE, CALCIUM CHLORIDE 600; 310; 30; 20 MG/100ML; MG/100ML; MG/100ML; MG/100ML
INJECTION, SOLUTION INTRAVENOUS CONTINUOUS PRN
Status: DISCONTINUED | OUTPATIENT
Start: 2017-12-20 | End: 2017-12-20

## 2017-12-20 RX ADMIN — Medication 0.5 MG: at 13:52

## 2017-12-20 RX ADMIN — POTASSIUM CHLORIDE, DEXTROSE MONOHYDRATE AND SODIUM CHLORIDE: 150; 5; 900 INJECTION, SOLUTION INTRAVENOUS at 21:32

## 2017-12-20 RX ADMIN — FENTANYL CITRATE 50 MCG: 50 INJECTION INTRAMUSCULAR; INTRAVENOUS at 09:28

## 2017-12-20 RX ADMIN — ONDANSETRON 4 MG: 2 INJECTION INTRAMUSCULAR; INTRAVENOUS at 08:28

## 2017-12-20 RX ADMIN — PIPERACILLIN SODIUM AND TAZOBACTAM SODIUM 3.38 G: 36; 4.5 INJECTION, POWDER, FOR SOLUTION INTRAVENOUS at 15:20

## 2017-12-20 RX ADMIN — DEXMEDETOMIDINE HYDROCHLORIDE 16 MCG: 100 INJECTION, SOLUTION INTRAVENOUS at 08:24

## 2017-12-20 RX ADMIN — FENTANYL CITRATE 25 MCG: 50 INJECTION, SOLUTION INTRAMUSCULAR; INTRAVENOUS at 08:34

## 2017-12-20 RX ADMIN — PIPERACILLIN SODIUM AND TAZOBACTAM SODIUM 3.38 G: 36; 4.5 INJECTION, POWDER, FOR SOLUTION INTRAVENOUS at 20:26

## 2017-12-20 RX ADMIN — FENTANYL CITRATE 25 MCG: 50 INJECTION, SOLUTION INTRAMUSCULAR; INTRAVENOUS at 08:32

## 2017-12-20 RX ADMIN — SODIUM CHLORIDE: 9 INJECTION, SOLUTION INTRAVENOUS at 05:58

## 2017-12-20 RX ADMIN — ONDANSETRON 4 MG: 2 SOLUTION INTRAMUSCULAR; INTRAVENOUS at 09:34

## 2017-12-20 RX ADMIN — Medication 0.5 MG: at 23:59

## 2017-12-20 RX ADMIN — DEXMEDETOMIDINE HYDROCHLORIDE 4 MCG: 100 INJECTION, SOLUTION INTRAVENOUS at 08:31

## 2017-12-20 RX ADMIN — PROPOFOL 135 MCG/KG/MIN: 10 INJECTION, EMULSION INTRAVENOUS at 08:24

## 2017-12-20 RX ADMIN — POTASSIUM CHLORIDE AND SODIUM CHLORIDE: 900; 150 INJECTION, SOLUTION INTRAVENOUS at 10:38

## 2017-12-20 RX ADMIN — MIDAZOLAM 2 MG: 1 INJECTION INTRAMUSCULAR; INTRAVENOUS at 08:19

## 2017-12-20 RX ADMIN — Medication 0.5 MG: at 20:59

## 2017-12-20 RX ADMIN — PANTOPRAZOLE SODIUM 40 MG: 40 INJECTION, POWDER, FOR SOLUTION INTRAVENOUS at 10:38

## 2017-12-20 RX ADMIN — Medication 0.5 MG: at 11:24

## 2017-12-20 RX ADMIN — PROCHLORPERAZINE EDISYLATE 10 MG: 5 INJECTION INTRAMUSCULAR; INTRAVENOUS at 12:59

## 2017-12-20 RX ADMIN — DEXAMETHASONE SODIUM PHOSPHATE 4 MG: 4 INJECTION, SOLUTION INTRA-ARTICULAR; INTRALESIONAL; INTRAMUSCULAR; INTRAVENOUS; SOFT TISSUE at 08:28

## 2017-12-20 RX ADMIN — FENTANYL CITRATE 25 MCG: 50 INJECTION, SOLUTION INTRAMUSCULAR; INTRAVENOUS at 08:39

## 2017-12-20 RX ADMIN — SODIUM CHLORIDE, POTASSIUM CHLORIDE, SODIUM LACTATE AND CALCIUM CHLORIDE: 600; 310; 30; 20 INJECTION, SOLUTION INTRAVENOUS at 08:21

## 2017-12-20 RX ADMIN — Medication 0.5 MG: at 16:47

## 2017-12-20 RX ADMIN — PIPERACILLIN SODIUM AND TAZOBACTAM SODIUM 3.38 G: 36; 4.5 INJECTION, POWDER, FOR SOLUTION INTRAVENOUS at 09:19

## 2017-12-20 RX ADMIN — HYDROMORPHONE HYDROCHLORIDE 0.5 MG: 1 INJECTION, SOLUTION INTRAMUSCULAR; INTRAVENOUS; SUBCUTANEOUS at 09:28

## 2017-12-20 RX ADMIN — Medication 0.3 MG: at 03:44

## 2017-12-20 RX ADMIN — FENTANYL CITRATE 25 MCG: 50 INJECTION, SOLUTION INTRAMUSCULAR; INTRAVENOUS at 08:37

## 2017-12-20 RX ADMIN — HYDROMORPHONE HYDROCHLORIDE 0.5 MG: 1 INJECTION, SOLUTION INTRAMUSCULAR; INTRAVENOUS; SUBCUTANEOUS at 09:13

## 2017-12-20 RX ADMIN — SODIUM CHLORIDE, POTASSIUM CHLORIDE, SODIUM LACTATE AND CALCIUM CHLORIDE: 600; 310; 30; 20 INJECTION, SOLUTION INTRAVENOUS at 07:56

## 2017-12-20 ASSESSMENT — ACTIVITIES OF DAILY LIVING (ADL)
DRESS: 0-->INDEPENDENT
COGNITION: 0 - NO COGNITION ISSUES REPORTED
SWALLOWING: 0-->SWALLOWS FOODS/LIQUIDS WITHOUT DIFFICULTY
RETIRED_COMMUNICATION: 0-->UNDERSTANDS/COMMUNICATES WITHOUT DIFFICULTY
FALL_HISTORY_WITHIN_LAST_SIX_MONTHS: NO
TOILETING: 0-->INDEPENDENT
BATHING: 0-->INDEPENDENT
AMBULATION: 0-->INDEPENDENT
RETIRED_EATING: 0-->INDEPENDENT
TRANSFERRING: 0-->INDEPENDENT

## 2017-12-20 NOTE — ANESTHESIA CARE TRANSFER NOTE
Patient: Humera Grey    Procedure(s):  ENDOSCOPIC RETROGRADE CHOLANGIOPANCREATOGRAM   - Wound Class: II-Clean Contaminated    Diagnosis: unknown  Diagnosis Additional Information: No value filed.    Anesthesia Type:   MAC     Note:  Airway :Nasal Cannula  Patient transferred to:PACU  Comments: At end of procedure, spontaneous respirations, patient alert to voice, able to follow commands. Oxygen via nasal cannula at 4 liters per minute to PACU. Oxygen tubing connected to wall O2 in PACU, SpO2, NiBP, and EKG monitors and alarms on and functioning, Alejandro Hugger warmer connected to patient gown, report on patient's clinical status given to PACU RN, RN questions answered.Handoff Report: Identifed the Patient, Identified the Reponsible Provider, Reviewed the pertinent medical history, Discussed the surgical course, Reviewed Intra-OP anesthesia mangement and issues during anesthesia, Set expectations for post-procedure period and Allowed opportunity for questions and acknowledgement of understanding      Vitals: (Last set prior to Anesthesia Care Transfer)    CRNA VITALS  12/20/2017 0816 - 12/20/2017 0852      12/20/2017             Pulse: 105    SpO2: 97 %    Resp Rate (set): 10                Electronically Signed By: ALLA Copeland CRNA  December 20, 2017  8:52 AM

## 2017-12-20 NOTE — PHARMACY-ADMISSION MEDICATION HISTORY
Admission medication history interview status for the 12/19/2017  admission is complete. See EPIC admission navigator for prior to admission medications     Medication history source reliability:Moderate    Actions taken by pharmacist (provider contacted, etc): called [facility] in [place], [phone]     Additional medication history information not noted on PTA med list :None    Medication reconciliation/reorder completed by provider prior to medication history? No    Time spent in this activity: 10 minutes    Prior to Admission medications    Medication Sig Last Dose Taking? Auth Provider   calcium carbonate (TUMS) 500 MG chewable tablet Take 1-2 chew tab by mouth 3 times daily as needed for heartburn prn Yes Unknown, Entered By History   Ibuprofen (ADVIL PO) Take 200-400 mg by mouth every 6 hours as needed for moderate pain prn Yes Unknown, Entered By History   bismuth subsalicylate (PEPTO BISMOL) 262 MG/15ML suspension Take 15 mLs by mouth every 6 hours as needed for indigestion prn Yes Unknown, Entered By History   PREDNISONE PO Take by mouth daily Pt states started this yesterday, had one dose - called pharmacy but they don't show record of it, so unknown dose 12/18/2017 at Unknown time Yes Unknown, Entered By History

## 2017-12-20 NOTE — ANESTHESIA PREPROCEDURE EVALUATION
Anesthesia Evaluation     . Pt has had prior anesthetic.     No history of anesthetic complications          ROS/MED HX    ENT/Pulmonary:      (-) sleep apnea   Neurologic:       Cardiovascular:         METS/Exercise Tolerance:     Hematologic:         Musculoskeletal:         GI/Hepatic:     (+) GERD cholecystitis/cholelithiasis,       Renal/Genitourinary:         Endo:         Psychiatric:     (+) psychiatric history anxiety and depression      Infectious Disease:         Malignancy:   (+) Malignancy History of Breast          Other:                                    Anesthesia Plan      History & Physical Review  History and physical reviewed and following examination; no interval change.    ASA Status:  2 .    NPO Status:  > 8 hours    Plan for MAC with Intravenous induction. Maintenance will be Balanced.    PONV prophylaxis:  Ondansetron (or other 5HT-3) and Dexamethasone or Solumedrol       Postoperative Care  Postoperative pain management:  IV analgesics and Oral pain medications.      Consents  Anesthetic plan, risks, benefits and alternatives discussed with:  Patient..                        Procedure: Procedure(s):  ENDOSCOPIC RETROGRADE CHOLANGIOPANCREATOGRAM  Preop diagnosis: unknown    Allergies   Allergen Reactions     Bupropion Hives     Contrast Dye Hives     Per patient report     Escitalopram Hives     Sertraline      Morphine Hives     Possible allergy     Venlafaxine Rash     Past Medical History:   Diagnosis Date     Depressive disorder      Gastroesophageal reflux disease      Past Surgical History:   Procedure Laterality Date     CYSTECTOMY OVARIAN BENIGN       MASTECTOMY       Social History   Substance Use Topics     Smoking status: Never Smoker     Smokeless tobacco: Not on file     Alcohol use Yes      Comment: 5 times a week     Prior to Admission medications    Medication Sig Start Date End Date Taking? Authorizing Provider   calcium carbonate (TUMS) 500 MG chewable tablet Take 1-2  chew tab by mouth 3 times daily as needed for heartburn   Yes Unknown, Entered By History   Ibuprofen (ADVIL PO) Take 200-400 mg by mouth every 6 hours as needed for moderate pain   Yes Unknown, Entered By History   bismuth subsalicylate (PEPTO BISMOL) 262 MG/15ML suspension Take 15 mLs by mouth every 6 hours as needed for indigestion   Yes Unknown, Entered By History   PREDNISONE PO Take by mouth daily Pt states started this yesterday, had one dose - called pharmacy but they don't show record of it, so unknown dose   Yes Unknown, Entered By History     Current Facility-Administered Medications Ordered in Epic   Medication Dose Route Frequency Last Rate Last Dose     influenza quadrivalent (PF) vacc age 3 yrs and older (FLUZONE or Flulaval) injection 0.5 mL  0.5 mL Intramuscular Prior to discharge         lidocaine 1 % 1 mL  1 mL Other Q1H PRN         lidocaine (LMX4) cream   Topical Q1H PRN         sodium chloride (PF) 0.9% PF flush 3 mL  3 mL Intracatheter Q1H PRN         sodium chloride (PF) 0.9% PF flush 3 mL  3 mL Intracatheter Q8H         May continue current IV fluids if patient has IV fluids infusing.   Does not apply Continuous PRN         sodium chloride (PF) 0.9% PF flush 3 mL  3 mL Intravenous q1 min prn         indomethacin (INDOCIN) 50 MG Suppository 100 mg  100 mg Rectal Once PRN         naloxone (NARCAN) injection 0.1-0.4 mg  0.1-0.4 mg Intravenous Q2 Min PRN         0.9% sodium chloride infusion   Intravenous Continuous 100 mL/hr at 12/20/17 0558       potassium chloride SA (K-DUR/KLOR-CON M) CR tablet 20-40 mEq  20-40 mEq Oral Q2H PRN         potassium chloride (KLOR-CON) Packet 20-40 mEq  20-40 mEq Oral or Feeding Tube Q2H PRN         potassium chloride 10 mEq in 100 mL sterile water intermittent infusion (premix)  10 mEq Intravenous Q1H PRN         potassium chloride 10 mEq in 100 mL intermittent infusion with 10 mg lidocaine  10 mEq Intravenous Q1H PRN         acetaminophen (TYLENOL) tablet 650  mg  650 mg Oral Q4H PRN         oxyCODONE IR (ROXICODONE) tablet 5-10 mg  5-10 mg Oral Q3H PRN         HYDROmorphone (PF) (DILAUDID) injection 0.3-0.5 mg  0.3-0.5 mg Intravenous Q2H PRN   0.3 mg at 12/20/17 0344     senna-docusate (SENOKOT-S;PERICOLACE) 8.6-50 MG per tablet 1 tablet  1 tablet Oral BID PRN        Or     senna-docusate (SENOKOT-S;PERICOLACE) 8.6-50 MG per tablet 2 tablet  2 tablet Oral BID PRN         ondansetron (ZOFRAN-ODT) ODT tab 4 mg  4 mg Oral Q6H PRN        Or     ondansetron (ZOFRAN) injection 4 mg  4 mg Intravenous Q6H PRN   4 mg at 12/19/17 2059     prochlorperazine (COMPAZINE) injection 10 mg  10 mg Intravenous Q6H PRN        Or     prochlorperazine (COMPAZINE) tablet 10 mg  10 mg Oral Q6H PRN        Or     prochlorperazine (COMPAZINE) Suppository 25 mg  25 mg Rectal Q12H PRN         diphenhydrAMINE (BENADRYL) capsule 25 mg  25 mg Oral Q6H PRN        Or     diphenhydrAMINE (BENADRYL) injection 25 mg  25 mg Intravenous Q6H PRN         No current [facility]-ordered outpatient prescriptions on file.       - MEDICATION INSTRUCTIONS -       NaCl 100 mL/hr at 12/20/17 0558     Wt Readings from Last 1 Encounters:   12/19/17 62.6 kg (138 lb)     Temp Readings from Last 1 Encounters:   12/20/17 36.8  C (98.3  F) (Oral)     BP Readings from Last 6 Encounters:   12/20/17 110/66     Pulse Readings from Last 4 Encounters:   12/20/17 77     Resp Readings from Last 1 Encounters:   12/20/17 18     SpO2 Readings from Last 1 Encounters:   12/20/17 93%     Recent Labs   Lab Test  12/19/17   1610   NA  134   POTASSIUM  3.7   CHLORIDE  98   CO2  25   ANIONGAP  11   GLC  84   BUN  7   CR  0.49*   ANTONETTE  9.7     Recent Labs   Lab Test  12/19/17   1610   AST  578*   ALT  1228*   ALKPHOS  254*   BILITOTAL  6.1*   LIPASE  175     Recent Labs   Lab Test  12/19/17 2045 12/19/17   1610   WBC   --   11.6*   HGB   --   15.0   PLT  273  310     No results for input(s): ABO, RH in the last 52052 hours.  No results for input(s):  INR, PTT in the last 67622 hours.   No results for input(s): TROPI in the last 07335 hours.  No results for input(s): PH, PCO2, PO2, HCO3 in the last 02547 hours.  No results for input(s): HCG in the last 24214 hours.  Recent Results (from the past 744 hour(s))   Abdomen US, limited (RUQ only)    Narrative    RIGHT UPPER QUADRANT ULTRASOUND 12/19/2017 6:35 PM    HISTORY:  Right upper quadrant pain    COMPARISON: None.    FINDINGS:    Gallbladder: Cholelithiasis filling the gallbladder with shadowing  obscuring all of the gallbladder except for the anterior wall which is  borderline 0.3 cm in thickness. Patient is tender in the right upper  quadrant with positive sonographic Jules's.    Bile ducts:  0.75 cm common bile duct is prominent. No intrahepatic  duct dilatation.    Liver:  Increased echogenicity consistent with fatty infiltration.    Pancreas:  Almost completely obscured, no gross abnormality in the  pancreas region.    Right kidney:  Lower pole right kidney partially obscured by bowel  gas, visualized right kidney portions are normal approximately 10.4 cm  in length      Impression    IMPRESSION:    1. Cholelithiasis filling the gallbladder. There is gallbladder wall  thickness upper normal at 0.3 cm. Positive sonographic Jules's sign.  With tenderness over the gallbladder, early cholecystitis cannot be  excluded although gallbladder wall is only borderline thickened.  2. Prominent 0.7 cm common hepatic duct.  3. Fatty infiltration of the liver.    CELIA IZQUIERDO MD       RECENT LABS:   ECG:   ECHO:

## 2017-12-20 NOTE — OR NURSING
ERCP with Dr Leonard in OR 51. No specimens obtained . Sphincterotomy, stone extraction, stent placement

## 2017-12-20 NOTE — ANESTHESIA POSTPROCEDURE EVALUATION
Patient: Humera Grey    Procedure(s):  ENDOSCOPIC RETROGRADE CHOLANGIOPANCREATOGRAM, STONE REMOVAL, SPHINCTEROTOMY AND BILE DUCT STENT PLACEMENT   - Wound Class: II-Clean Contaminated   - Wound Class: II-Clean Contaminated   - Wound Class: II-Clean Contaminated    Diagnosis:unknown  Diagnosis Additional Information: No value filed.    Anesthesia Type:  MAC    Note:  Anesthesia Post Evaluation    Patient location during evaluation: PACU  Patient participation: Able to fully participate in evaluation  Level of consciousness: awake  Pain management: adequate  Airway patency: patent  Cardiovascular status: acceptable  Respiratory status: acceptable  Hydration status: acceptable  PONV: none     Anesthetic complications: None          Last vitals:  Vitals:    12/20/17 1005 12/20/17 1100 12/20/17 1252   BP: 104/68 108/64 116/72   Pulse:      Resp: 18 16 18   Temp: 36.4  C (97.6  F)  36.3  C (97.3  F)   SpO2: 96% 94% 95%         Electronically Signed By: Magaly Rangel MD, MD  December 20, 2017  3:23 PM

## 2017-12-20 NOTE — PLAN OF CARE
Problem: Pancreatitis, Acute/Chronic (Adult)  Goal: Signs and Symptoms of Listed Potential Problems Will be Absent, Minimized or Managed (Pancreatitis, Acute/Chronic)  Signs and symptoms of listed potential problems will be absent, minimized or managed by discharge/transition of care (reference Pancreatitis, Acute/Chronic (Adult) CPG).  Outcome: No Change  PT A&O RA c/o abdominal pain along N&V gave PRN dilaudid and IV Zofran effective. PT is NPO up with SBA IV infuse 100/hr.  plan  for ERCP in the morning

## 2017-12-20 NOTE — PLAN OF CARE
Problem: Patient Care Overview  Goal: Plan of Care/Patient Progress Review  Outcome: Improving  Pt alert & oriented x4. Endoscopy this a.m. ARCP, Stent placement and stone removal post-op. SBA x1. VSS, mid 90s O2 sats on RA. C/o pain in abdomen, given IVP Dilaudid 2x. C/o nausea given Zofran 0830 post-op and comprazine 1x. NPO for surgery. NS with KCl 20 mEq continuous 100mL/h, IV patent. . . Will monitor.

## 2017-12-20 NOTE — PROGRESS NOTES
[facility]  Hospitalist Progress Note   12/20/2017          Assessment and Plan:      Humera Grey is a 52 year old female admitted with severe RUQ abdominal pain with dilated CBD and Multiple gallstones. US imaging consistent with Cholelithiasis. Underwent ERCP  this morning.     1. Abdominal pain improved  2. Choledocholithiasis status post ERCP with  biliary sphincterotomy and  balloon extraction, stent in CBD.  3. Acute Ascending Cholangitis   Patient admits to significant improvement in her abdominal pain. Currently 3/10 in intensity. Discussed with Dr. Dixon, recommended surgical evaluation for possible cholecystectomy. Repeat ERCP in 3 months to remove stent Appreciate  gastroenterology evaluation.   Awaiting surgical evaluation.  Pain management with IV Dilaudid/oral oxycodone as tolerated.  Zofran as needed for nausea and vomiting.  NS KCl at 100 ML per hour  Zosyn for cholangitis.   IV Protonix for G.I. Prophylaxis.    Active Diet Order      NPO for Medical/Clinical Reasons Except for: Ice Chips    DVT Prophylaxis:   Code Status: Full Code  Disposition: Expected discharge in 1-2 days once stable.    Patient, family, interdisciplinary team involved in care and agrees with plan.  Total time - Greater than 35 min. More than 50% of time spent in direct patient care, care coordination, patient/caregiver counseling, and formalizing plan of care.     Payton Gorman MD        Interval History:       Patient admits to significant improvement in her abdominal pain. Currently 3/10 in intensity. Discussed with Dr. Dixon, recommended surgical evaluation for possible cholecystectomy.  Tolerating ice chips       Physical Exam:        Physical Exam   Weight: 62.6 kg (138 lb)  Vital Signs with Ranges  Temp:  [97.1  F (36.2  C)-98.6  F (37  C)] 98.4  F (36.9  C)  Pulse:  [77-96] 86  Heart Rate:  [62-98] 78  Resp:  [11-22] 16  BP: ()/(52-90) 115/65  SpO2:  [89 %-98 %] 95 %    Intake/Output  Summary (Last 24 hours) at 12/20/17 1736  Last data filed at 12/20/17 0940   Gross per 24 hour   Intake             1356 ml   Output                0 ml   Net             1356 ml     PHYSICAL EXAM  GENERAL: Patient is in no distress. Alert and oriented.  HEART: Regular rate and rhythm. S1S2. No murmurs, gallops or rubs noted.  LUNGS: Clear to auscultation bilaterally. No expiratory wheeze.   ABDOMEN: Soft, abdominal tenderness with palpation. Active bowel sounds in all 4 quadrants.  EXTREMITIES: No pedal edema. 2+ peripheral pulses.  SKIN: Warm, dry.         Medications:          influenza quadrivalent (PF) vacc age 3 yrs and older  0.5 mL Intramuscular Prior to discharge     pantoprazole  40 mg Intravenous QAM AC     piperacillin-tazobactam  3.375 g Intravenous Q6H     naloxone, naloxone, potassium chloride, potassium chloride, potassium chloride, potassium chloride with lidocaine, acetaminophen, oxyCODONE IR, HYDROmorphone, senna-docusate **OR** senna-docusate, ondansetron **OR** ondansetron, prochlorperazine **OR** prochlorperazine **OR** prochlorperazine, diphenhydrAMINE **OR** diphenhydrAMINE         Data:      Labs:   ERCP Impression: - Choledocholithiasis was found. Complete removal                             was accomplished by biliary sphincterotomy and                             balloon extraction.                             - A biliary sphincterotomy was performed.                             - The biliary tree was swept and pus was found.                             Ascending Cholangitis.                             - One temporary stent was placed into the common                             bile duct.     Lab Results   Component Value Date    WBC 8.5 12/20/2017    HGB 12.2 12/20/2017    HCT 38.1 12/20/2017    MCV 87 12/20/2017     12/20/2017       Lab Results   Component Value Date     12/20/2017    BUN 13 12/20/2017    ANIONGAP 9 12/20/2017       Lab Results   Component Value Date      (HH) 12/20/2017     (H) 12/20/2017     (H) 12/20/2017    ALKPHOS 227 (H) 12/20/2017     Imaging:  Abdomen Us, Limited (ruq Only)    Result Date: 12/19/2017  RIGHT UPPER QUADRANT ULTRASOUND 12/19/2017 6:35 PM HISTORY:  Right upper quadrant pain COMPARISON: None. FINDINGS:  Gallbladder: Cholelithiasis filling the gallbladder with shadowing obscuring all of the gallbladder except for the anterior wall which is borderline 0.3 cm in thickness. Patient is tender in the right upper quadrant with positive sonographic Jules's. Bile ducts:  0.75 cm common bile duct is prominent. No intrahepatic duct dilatation. Liver:  Increased echogenicity consistent with fatty infiltration. Pancreas:  Almost completely obscured, no gross abnormality in the pancreas region. Right kidney:  Lower pole right kidney partially obscured by bowel gas, visualized right kidney portions are normal approximately 10.4 cm in length     IMPRESSION:  1. Cholelithiasis filling the gallbladder. There is gallbladder wall thickness upper normal at 0.3 cm. Positive sonographic Jules's sign. With tenderness over the gallbladder, early cholecystitis cannot be excluded although gallbladder wall is only borderline thickened. 2. Prominent 0.7 cm common hepatic duct. 3. Fatty infiltration of the liver. CELIA IZQUIERDO MD

## 2017-12-20 NOTE — PLAN OF CARE
Problem: Patient Care Overview  Goal: Plan of Care/Patient Progress Review  Outcome: No Change  Pt A&Ox4. VSS on RA. Tele NSR, bradycardia at times. Up SBA to B/R. Abdominal pain managed with prn iv dilaudid 0.3mg x1, effective. Denied N&V. NPO except ice and meds. + flatus. +BS. GI consult. Plan for ERCP today per MD note, no exact time. Nursing continue to monitor.

## 2017-12-20 NOTE — ED NOTES
Pt complained of a bilateral abdominal itching rash.  Dr. Ortiz informred, pt given 25mg of benadryl IV>

## 2017-12-20 NOTE — H&P
[facility]    History and Physical  Hospitalist       Date of Admission:  12/19/2017    Assessment & Plan   Humera Grey is a 52 year old female with PMH of breast cancer, anxiety, depression, who was sent from GI clinic with abdominal pain, FTH cholecystitis. GI consulted, plan ERCP tomorrow.    Likely choledocholithiasis  Suspected cholecystitis  Patient has been having cholelithiasis with abd pain after eating for the past two months. She describes worsening abdominal pain, N/V for the past two days. A CT scan obtained 12/18 (yesterday) was notable for significant cholelithiasis, abnormal gallbladder, and distended CBD, which was also found on US abdomen in ED today. LFTs show significantly elevated ALT, AST, alk phos, and bilirubin suggesting choledocholithiasis. She has a leukocytosis as well. No pancreatic involvement with normal lipase. Overall her symptoms are consistent with developing cholecystitis, without evidence for ascending cholangitis.  - GI consulted, likely ERCP in AM  - Clear liquid diet, NPO @ MN  - NS @ 100ml/hr  - Zofran and compazine for nausea  - Tylenol, oxycodone, Dilaudid IV for pain control  - Benadryl PO/IV for allergies  - Patient reports hives reaction to contrast dye--allergy added to chart    DVT Prophylaxis: Pneumatic Compression Devices  Code Status: Full Code    Disposition: Expected discharge pending GI workup    Yuri Lainez MD    Primary Care Physician   JOAN BARBOZA    Chief Complaint   Abdominal pain    History is obtained from the patient   Justin at bedside    History of Present Illness   Humera Grey is a 52 year old female who presents with abdominal pain. Patient describes ongoing abdominal pain for the past two months that usually occur after eating food. Over the past day or so, this abdominal pain has been significantly worse. Yesterday after a CT scan, she vomited. She noted worsening acid reflux. Today, she describes  sharp persistent pain in her epigastric and RUQ region, along with nausea. Her last BM was Saturday. She does not have fevers or chills. She has been able to eat very little--just soup. She notes her urine has been dark.    Past Medical History    I have reviewed this patient's medical history and updated it with pertinent information if needed.   Past Medical History:   Diagnosis Date     Depressive disorder      Gastroesophageal reflux disease        Past Surgical History   I have reviewed this patient's surgical history and updated it with pertinent information if needed.  Past Surgical History:   Procedure Laterality Date     CYSTECTOMY OVARIAN BENIGN       MASTECTOMY         Prior to Admission Medications   Prior to Admission Medications   Prescriptions Last Dose Informant Patient Reported? Taking?   Ibuprofen (ADVIL PO) prn Self Yes Yes   Sig: Take 200-400 mg by mouth every 6 hours as needed for moderate pain   PREDNISONE PO 12/18/2017 at Unknown time Self Yes Yes   Sig: Take by mouth daily Pt states started this yesterday, had one dose - called pharmacy but they don't show record of it, so unknown dose   bismuth subsalicylate (PEPTO BISMOL) 262 MG/15ML suspension prn Self Yes Yes   Sig: Take 15 mLs by mouth every 6 hours as needed for indigestion   calcium carbonate (TUMS) 500 MG chewable tablet prn Self Yes Yes   Sig: Take 1-2 chew tab by mouth 3 times daily as needed for heartburn      Facility-Administered Medications: None     Allergies   Allergies   Allergen Reactions     Sertraline        Social History   I have reviewed this patient's social history and updated it with pertinent information if needed. Humera DAVEY Grey  reports that she has never smoked. She does not have any smokeless tobacco history on file. She reports that she drinks alcohol. She reports that she does not use illicit drugs. Works as a . She drinks approx 5-10 drinks per week.    Family History   I have reviewed  this patient's family history and updated it with pertinent information if needed.   No family history on file.   Denies any family history of GI disease, gallbladder disease.    Review of Systems   The 10 point Review of Systems is negative other than noted in the HPI or here.    Physical Exam   Temp: 97.8  F (36.6  C) Temp src: Oral BP: 147/90 Pulse: 99     SpO2: 95 % O2 Device: None (Room air)    Vital Signs with Ranges  Temp:  [97.8  F (36.6  C)] 97.8  F (36.6  C)  Pulse:  [99] 99  BP: (142-175)/(80-99) 147/90  SpO2:  [95 %-100 %] 95 %  138 lbs 0 oz    Constitutional: Female appearing tired, with abdominal discomfort  Eyes: PERRL, nonicteric, normal ocular movements  HEENT: Normocephalic, atraumatic, dry oral mucosa  Respiratory: CTAB, no wheezing or crackles  Cardiovascular: RRR, normal S1/2, no m/r/g  GI: No organomegaly, normoactive bowel sounds, nontender, nondistended  Vascular: Palpable peripheral pulses in upper and lower extremities, no lower extremity pitting edema  Skin: No rashes or scars  Musculoskeletal: Normal strength in UE and LE, moves all extremities  Neurologic: A&Ox3  Psychiatric: Appropriate affect and mood    Data   Data reviewed today:  I personally reviewed imaging results from this admission and CT scan from 12/18.    Recent Labs  Lab 12/19/17  1610   WBC 11.6*   HGB 15.0   MCV 83         POTASSIUM 3.7   CHLORIDE 98   CO2 25   BUN 7   CR 0.49*   ANIONGAP 11   ANTONETTE 9.7   GLC 84   ALBUMIN 3.9   PROTTOTAL 8.0   BILITOTAL 6.1*   ALKPHOS 254*   ALT 1228*   *   LIPASE 175       Imaging:  Recent Results (from the past 24 hour(s))   Abdomen US, limited (RUQ only)    Narrative    RIGHT UPPER QUADRANT ULTRASOUND 12/19/2017 6:35 PM    HISTORY:  Right upper quadrant pain    COMPARISON: None.    FINDINGS:    Gallbladder: Cholelithiasis filling the gallbladder with shadowing  obscuring all of the gallbladder except for the anterior wall which is  borderline 0.3 cm in thickness.  Patient is tender in the right upper  quadrant with positive sonographic Jules's.    Bile ducts:  0.75 cm common bile duct is prominent. No intrahepatic  duct dilatation.    Liver:  Increased echogenicity consistent with fatty infiltration.    Pancreas:  Almost completely obscured, no gross abnormality in the  pancreas region.    Right kidney:  Lower pole right kidney partially obscured by bowel  gas, visualized right kidney portions are normal approximately 10.4 cm  in length      Impression    IMPRESSION:    1. Cholelithiasis filling the gallbladder. There is gallbladder wall  thickness upper normal at 0.3 cm. Positive sonographic Jules's sign.  With tenderness over the gallbladder, early cholecystitis cannot be  excluded although gallbladder wall is only borderline thickened.  2. Prominent 0.7 cm common hepatic duct.  3. Fatty infiltration of the liver.    CELIA IZQUIERDO MD

## 2017-12-21 ENCOUNTER — ANESTHESIA (OUTPATIENT)
Dept: SURGERY | Facility: CLINIC | Age: 52
End: 2017-12-21
Payer: COMMERCIAL

## 2017-12-21 ENCOUNTER — ANESTHESIA EVENT (OUTPATIENT)
Dept: SURGERY | Facility: CLINIC | Age: 52
End: 2017-12-21
Payer: COMMERCIAL

## 2017-12-21 ENCOUNTER — APPOINTMENT (OUTPATIENT)
Dept: SURGERY | Facility: PHYSICIAN GROUP | Age: 52
End: 2017-12-21
Payer: COMMERCIAL

## 2017-12-21 LAB
ALBUMIN SERPL-MCNC: 3 G/DL (ref 3.4–5)
ALP SERPL-CCNC: 225 U/L (ref 40–150)
ALT SERPL W P-5'-P-CCNC: 646 U/L (ref 0–50)
ANION GAP SERPL CALCULATED.3IONS-SCNC: 5 MMOL/L (ref 3–14)
AST SERPL W P-5'-P-CCNC: 209 U/L (ref 0–45)
BILIRUB SERPL-MCNC: 1.6 MG/DL (ref 0.2–1.3)
BUN SERPL-MCNC: 6 MG/DL (ref 7–30)
CALCIUM SERPL-MCNC: 8.3 MG/DL (ref 8.5–10.1)
CHLORIDE SERPL-SCNC: 104 MMOL/L (ref 94–109)
CO2 SERPL-SCNC: 28 MMOL/L (ref 20–32)
CREAT SERPL-MCNC: 0.49 MG/DL (ref 0.52–1.04)
ERYTHROCYTE [DISTWIDTH] IN BLOOD BY AUTOMATED COUNT: 15 % (ref 10–15)
GFR SERPL CREATININE-BSD FRML MDRD: >90 ML/MIN/1.7M2
GLUCOSE BLDC GLUCOMTR-MCNC: 107 MG/DL (ref 70–99)
GLUCOSE SERPL-MCNC: 92 MG/DL (ref 70–99)
HCT VFR BLD AUTO: 38.6 % (ref 35–47)
HGB BLD-MCNC: 12.6 G/DL (ref 11.7–15.7)
INR PPP: 0.94 (ref 0.86–1.14)
MCH RBC QN AUTO: 28.2 PG (ref 26.5–33)
MCHC RBC AUTO-ENTMCNC: 32.6 G/DL (ref 31.5–36.5)
MCV RBC AUTO: 86 FL (ref 78–100)
PLATELET # BLD AUTO: 259 10E9/L (ref 150–450)
POTASSIUM SERPL-SCNC: 3.7 MMOL/L (ref 3.4–5.3)
PROT SERPL-MCNC: 6.6 G/DL (ref 6.8–8.8)
RBC # BLD AUTO: 4.47 10E12/L (ref 3.8–5.2)
SODIUM SERPL-SCNC: 137 MMOL/L (ref 133–144)
WBC # BLD AUTO: 13.2 10E9/L (ref 4–11)

## 2017-12-21 PROCEDURE — 25000566 ZZH SEVOFLURANE, EA 15 MIN: Performed by: SURGERY

## 2017-12-21 PROCEDURE — 25000128 H RX IP 250 OP 636: Performed by: ANESTHESIOLOGY

## 2017-12-21 PROCEDURE — 25800025 ZZH RX 258: Performed by: PHYSICIAN ASSISTANT

## 2017-12-21 PROCEDURE — 25000128 H RX IP 250 OP 636: Performed by: NURSE ANESTHETIST, CERTIFIED REGISTERED

## 2017-12-21 PROCEDURE — 40000169 ZZH STATISTIC PRE-PROCEDURE ASSESSMENT I: Performed by: SURGERY

## 2017-12-21 PROCEDURE — 36000056 ZZH SURGERY LEVEL 3 1ST 30 MIN: Performed by: SURGERY

## 2017-12-21 PROCEDURE — 37000008 ZZH ANESTHESIA TECHNICAL FEE, 1ST 30 MIN: Performed by: SURGERY

## 2017-12-21 PROCEDURE — 25000128 H RX IP 250 OP 636: Performed by: INTERNAL MEDICINE

## 2017-12-21 PROCEDURE — 47562 LAPAROSCOPIC CHOLECYSTECTOMY: CPT | Performed by: SURGERY

## 2017-12-21 PROCEDURE — 25800025 ZZH RX 258: Performed by: SURGERY

## 2017-12-21 PROCEDURE — 36415 COLL VENOUS BLD VENIPUNCTURE: CPT | Performed by: HOSPITALIST

## 2017-12-21 PROCEDURE — 00000146 ZZHCL STATISTIC GLUCOSE BY METER IP

## 2017-12-21 PROCEDURE — 80053 COMPREHEN METABOLIC PANEL: CPT | Performed by: HOSPITALIST

## 2017-12-21 PROCEDURE — 27210995 ZZH RX 272: Performed by: SURGERY

## 2017-12-21 PROCEDURE — 0FT44ZZ RESECTION OF GALLBLADDER, PERCUTANEOUS ENDOSCOPIC APPROACH: ICD-10-PCS | Performed by: SURGERY

## 2017-12-21 PROCEDURE — 88304 TISSUE EXAM BY PATHOLOGIST: CPT | Performed by: SURGERY

## 2017-12-21 PROCEDURE — 25000125 ZZHC RX 250: Performed by: NURSE ANESTHETIST, CERTIFIED REGISTERED

## 2017-12-21 PROCEDURE — 88304 TISSUE EXAM BY PATHOLOGIST: CPT | Mod: 26 | Performed by: SURGERY

## 2017-12-21 PROCEDURE — 25000128 H RX IP 250 OP 636: Performed by: SURGERY

## 2017-12-21 PROCEDURE — 85027 COMPLETE CBC AUTOMATED: CPT | Performed by: HOSPITALIST

## 2017-12-21 PROCEDURE — 12000000 ZZH R&B MED SURG/OB

## 2017-12-21 PROCEDURE — 25000125 ZZHC RX 250: Performed by: SURGERY

## 2017-12-21 PROCEDURE — 25800025 ZZH RX 258: Performed by: INTERNAL MEDICINE

## 2017-12-21 PROCEDURE — 36000058 ZZH SURGERY LEVEL 3 EA 15 ADDTL MIN: Performed by: SURGERY

## 2017-12-21 PROCEDURE — 85610 PROTHROMBIN TIME: CPT | Performed by: HOSPITALIST

## 2017-12-21 PROCEDURE — 25000132 ZZH RX MED GY IP 250 OP 250 PS 637: Performed by: INTERNAL MEDICINE

## 2017-12-21 PROCEDURE — 37000009 ZZH ANESTHESIA TECHNICAL FEE, EACH ADDTL 15 MIN: Performed by: SURGERY

## 2017-12-21 PROCEDURE — 25000125 ZZHC RX 250: Performed by: HOSPITALIST

## 2017-12-21 PROCEDURE — 27210794 ZZH OR GENERAL SUPPLY STERILE: Performed by: SURGERY

## 2017-12-21 PROCEDURE — 71000012 ZZH RECOVERY PHASE 1 LEVEL 1 FIRST HR: Performed by: SURGERY

## 2017-12-21 PROCEDURE — 47562 LAPAROSCOPIC CHOLECYSTECTOMY: CPT | Mod: AS | Performed by: PHYSICIAN ASSISTANT

## 2017-12-21 PROCEDURE — 99232 SBSQ HOSP IP/OBS MODERATE 35: CPT | Performed by: HOSPITALIST

## 2017-12-21 RX ORDER — HYDROMORPHONE HYDROCHLORIDE 1 MG/ML
.3-.5 INJECTION, SOLUTION INTRAMUSCULAR; INTRAVENOUS; SUBCUTANEOUS EVERY 5 MIN PRN
Status: DISCONTINUED | OUTPATIENT
Start: 2017-12-21 | End: 2017-12-21 | Stop reason: HOSPADM

## 2017-12-21 RX ORDER — GLYCOPYRROLATE 0.2 MG/ML
INJECTION, SOLUTION INTRAMUSCULAR; INTRAVENOUS PRN
Status: DISCONTINUED | OUTPATIENT
Start: 2017-12-21 | End: 2017-12-21

## 2017-12-21 RX ORDER — FENTANYL CITRATE 50 UG/ML
100 INJECTION, SOLUTION INTRAMUSCULAR; INTRAVENOUS ONCE
Status: COMPLETED | OUTPATIENT
Start: 2017-12-21 | End: 2017-12-21

## 2017-12-21 RX ORDER — PROPOFOL 10 MG/ML
INJECTION, EMULSION INTRAVENOUS PRN
Status: DISCONTINUED | OUTPATIENT
Start: 2017-12-21 | End: 2017-12-21

## 2017-12-21 RX ORDER — NEOSTIGMINE METHYLSULFATE 1 MG/ML
VIAL (ML) INJECTION PRN
Status: DISCONTINUED | OUTPATIENT
Start: 2017-12-21 | End: 2017-12-21

## 2017-12-21 RX ORDER — LIDOCAINE HYDROCHLORIDE 20 MG/ML
INJECTION, SOLUTION INFILTRATION; PERINEURAL PRN
Status: DISCONTINUED | OUTPATIENT
Start: 2017-12-21 | End: 2017-12-21

## 2017-12-21 RX ORDER — FENTANYL CITRATE 50 UG/ML
INJECTION, SOLUTION INTRAMUSCULAR; INTRAVENOUS PRN
Status: DISCONTINUED | OUTPATIENT
Start: 2017-12-21 | End: 2017-12-21

## 2017-12-21 RX ORDER — DEXAMETHASONE SODIUM PHOSPHATE 4 MG/ML
INJECTION, SOLUTION INTRA-ARTICULAR; INTRALESIONAL; INTRAMUSCULAR; INTRAVENOUS; SOFT TISSUE PRN
Status: DISCONTINUED | OUTPATIENT
Start: 2017-12-21 | End: 2017-12-21

## 2017-12-21 RX ORDER — SODIUM CHLORIDE, SODIUM LACTATE, POTASSIUM CHLORIDE, CALCIUM CHLORIDE 600; 310; 30; 20 MG/100ML; MG/100ML; MG/100ML; MG/100ML
INJECTION, SOLUTION INTRAVENOUS CONTINUOUS
Status: DISCONTINUED | OUTPATIENT
Start: 2017-12-21 | End: 2017-12-21 | Stop reason: HOSPADM

## 2017-12-21 RX ORDER — ONDANSETRON 2 MG/ML
INJECTION INTRAMUSCULAR; INTRAVENOUS PRN
Status: DISCONTINUED | OUTPATIENT
Start: 2017-12-21 | End: 2017-12-21

## 2017-12-21 RX ORDER — LABETALOL HYDROCHLORIDE 5 MG/ML
INJECTION, SOLUTION INTRAVENOUS PRN
Status: DISCONTINUED | OUTPATIENT
Start: 2017-12-21 | End: 2017-12-21

## 2017-12-21 RX ORDER — ONDANSETRON 2 MG/ML
4 INJECTION INTRAMUSCULAR; INTRAVENOUS EVERY 30 MIN PRN
Status: DISCONTINUED | OUTPATIENT
Start: 2017-12-21 | End: 2017-12-21 | Stop reason: HOSPADM

## 2017-12-21 RX ORDER — DEXTROSE MONOHYDRATE, SODIUM CHLORIDE, AND POTASSIUM CHLORIDE 50; 1.49; 9 G/1000ML; G/1000ML; G/1000ML
INJECTION, SOLUTION INTRAVENOUS CONTINUOUS
Status: DISCONTINUED | OUTPATIENT
Start: 2017-12-21 | End: 2017-12-21

## 2017-12-21 RX ORDER — FENTANYL CITRATE 50 UG/ML
25-50 INJECTION, SOLUTION INTRAMUSCULAR; INTRAVENOUS
Status: DISCONTINUED | OUTPATIENT
Start: 2017-12-21 | End: 2017-12-21 | Stop reason: HOSPADM

## 2017-12-21 RX ORDER — NALOXONE HYDROCHLORIDE 0.4 MG/ML
.1-.4 INJECTION, SOLUTION INTRAMUSCULAR; INTRAVENOUS; SUBCUTANEOUS
Status: DISCONTINUED | OUTPATIENT
Start: 2017-12-21 | End: 2017-12-22 | Stop reason: HOSPADM

## 2017-12-21 RX ORDER — BUPIVACAINE HYDROCHLORIDE AND EPINEPHRINE 2.5; 5 MG/ML; UG/ML
INJECTION, SOLUTION INFILTRATION; PERINEURAL PRN
Status: DISCONTINUED | OUTPATIENT
Start: 2017-12-21 | End: 2017-12-21 | Stop reason: HOSPADM

## 2017-12-21 RX ORDER — MAGNESIUM HYDROXIDE 1200 MG/15ML
LIQUID ORAL PRN
Status: DISCONTINUED | OUTPATIENT
Start: 2017-12-21 | End: 2017-12-21 | Stop reason: HOSPADM

## 2017-12-21 RX ORDER — DEXTROSE MONOHYDRATE, SODIUM CHLORIDE, AND POTASSIUM CHLORIDE 50; 1.49; 9 G/1000ML; G/1000ML; G/1000ML
INJECTION, SOLUTION INTRAVENOUS CONTINUOUS
Status: DISCONTINUED | OUTPATIENT
Start: 2017-12-21 | End: 2017-12-22 | Stop reason: HOSPADM

## 2017-12-21 RX ORDER — ONDANSETRON 4 MG/1
4 TABLET, ORALLY DISINTEGRATING ORAL EVERY 30 MIN PRN
Status: DISCONTINUED | OUTPATIENT
Start: 2017-12-21 | End: 2017-12-21 | Stop reason: HOSPADM

## 2017-12-21 RX ADMIN — SODIUM CHLORIDE, POTASSIUM CHLORIDE, SODIUM LACTATE AND CALCIUM CHLORIDE: 600; 310; 30; 20 INJECTION, SOLUTION INTRAVENOUS at 12:35

## 2017-12-21 RX ADMIN — FENTANYL CITRATE 75 MCG: 50 INJECTION, SOLUTION INTRAMUSCULAR; INTRAVENOUS at 12:43

## 2017-12-21 RX ADMIN — ONDANSETRON 4 MG: 2 INJECTION INTRAMUSCULAR; INTRAVENOUS at 16:27

## 2017-12-21 RX ADMIN — Medication 0.5 MG: at 19:03

## 2017-12-21 RX ADMIN — PIPERACILLIN SODIUM AND TAZOBACTAM SODIUM 3.38 G: 36; 4.5 INJECTION, POWDER, FOR SOLUTION INTRAVENOUS at 03:04

## 2017-12-21 RX ADMIN — Medication 0.5 MG: at 02:03

## 2017-12-21 RX ADMIN — FENTANYL CITRATE 50 MCG: 50 INJECTION INTRAMUSCULAR; INTRAVENOUS at 14:41

## 2017-12-21 RX ADMIN — ONDANSETRON 4 MG: 2 INJECTION INTRAMUSCULAR; INTRAVENOUS at 22:31

## 2017-12-21 RX ADMIN — Medication 0.5 MG: at 10:29

## 2017-12-21 RX ADMIN — PIPERACILLIN SODIUM AND TAZOBACTAM SODIUM 3.38 G: 36; 4.5 INJECTION, POWDER, FOR SOLUTION INTRAVENOUS at 22:40

## 2017-12-21 RX ADMIN — PHENYLEPHRINE HYDROCHLORIDE 100 MCG: 10 INJECTION INTRAVENOUS at 12:30

## 2017-12-21 RX ADMIN — NEOSTIGMINE METHYLSULFATE 3 MG: 1 INJECTION INTRAMUSCULAR; INTRAVENOUS; SUBCUTANEOUS at 13:47

## 2017-12-21 RX ADMIN — FENTANYL CITRATE 50 MCG: 50 INJECTION, SOLUTION INTRAMUSCULAR; INTRAVENOUS at 13:02

## 2017-12-21 RX ADMIN — DEXAMETHASONE SODIUM PHOSPHATE 4 MG: 4 INJECTION, SOLUTION INTRA-ARTICULAR; INTRALESIONAL; INTRAMUSCULAR; INTRAVENOUS; SOFT TISSUE at 12:55

## 2017-12-21 RX ADMIN — Medication 0.5 MG: at 21:24

## 2017-12-21 RX ADMIN — FENTANYL CITRATE 50 MCG: 50 INJECTION, SOLUTION INTRAMUSCULAR; INTRAVENOUS at 13:16

## 2017-12-21 RX ADMIN — Medication 0.5 MG: at 08:20

## 2017-12-21 RX ADMIN — POTASSIUM CHLORIDE, DEXTROSE MONOHYDRATE AND SODIUM CHLORIDE: 150; 5; 900 INJECTION, SOLUTION INTRAVENOUS at 17:10

## 2017-12-21 RX ADMIN — PROPOFOL 200 MG: 10 INJECTION, EMULSION INTRAVENOUS at 12:43

## 2017-12-21 RX ADMIN — LABETALOL HYDROCHLORIDE 5 MG: 5 INJECTION, SOLUTION INTRAVENOUS at 13:25

## 2017-12-21 RX ADMIN — Medication 0.5 MG: at 16:16

## 2017-12-21 RX ADMIN — FENTANYL CITRATE 50 MCG: 50 INJECTION INTRAMUSCULAR; INTRAVENOUS at 14:34

## 2017-12-21 RX ADMIN — MIDAZOLAM 2 MG: 1 INJECTION INTRAMUSCULAR; INTRAVENOUS at 12:35

## 2017-12-21 RX ADMIN — ROCURONIUM BROMIDE 50 MG: 10 INJECTION INTRAVENOUS at 12:43

## 2017-12-21 RX ADMIN — GLYCOPYRROLATE 0.4 MG: 0.2 INJECTION, SOLUTION INTRAMUSCULAR; INTRAVENOUS at 13:47

## 2017-12-21 RX ADMIN — FENTANYL CITRATE 25 MCG: 50 INJECTION, SOLUTION INTRAMUSCULAR; INTRAVENOUS at 12:42

## 2017-12-21 RX ADMIN — Medication 0.5 MG: at 06:03

## 2017-12-21 RX ADMIN — PIPERACILLIN SODIUM AND TAZOBACTAM SODIUM 3.38 G: 36; 4.5 INJECTION, POWDER, FOR SOLUTION INTRAVENOUS at 10:07

## 2017-12-21 RX ADMIN — PANTOPRAZOLE SODIUM 40 MG: 40 INJECTION, POWDER, FOR SOLUTION INTRAVENOUS at 08:29

## 2017-12-21 RX ADMIN — SODIUM CHLORIDE, POTASSIUM CHLORIDE, SODIUM LACTATE AND CALCIUM CHLORIDE: 600; 310; 30; 20 INJECTION, SOLUTION INTRAVENOUS at 11:29

## 2017-12-21 RX ADMIN — FENTANYL CITRATE 100 MCG: 50 INJECTION, SOLUTION INTRAMUSCULAR; INTRAVENOUS at 11:44

## 2017-12-21 RX ADMIN — LABETALOL HYDROCHLORIDE 5 MG: 5 INJECTION, SOLUTION INTRAVENOUS at 13:08

## 2017-12-21 RX ADMIN — Medication 0.5 MG: at 04:00

## 2017-12-21 RX ADMIN — PIPERACILLIN SODIUM AND TAZOBACTAM SODIUM 3.38 G: 36; 4.5 INJECTION, POWDER, FOR SOLUTION INTRAVENOUS at 16:16

## 2017-12-21 RX ADMIN — HYDROMORPHONE HYDROCHLORIDE 0.5 MG: 1 INJECTION, SOLUTION INTRAMUSCULAR; INTRAVENOUS; SUBCUTANEOUS at 14:15

## 2017-12-21 RX ADMIN — OXYCODONE HYDROCHLORIDE 10 MG: 5 TABLET ORAL at 22:31

## 2017-12-21 RX ADMIN — ONDANSETRON 4 MG: 2 INJECTION INTRAMUSCULAR; INTRAVENOUS at 12:59

## 2017-12-21 RX ADMIN — LABETALOL HYDROCHLORIDE 5 MG: 5 INJECTION, SOLUTION INTRAVENOUS at 13:13

## 2017-12-21 RX ADMIN — LIDOCAINE HYDROCHLORIDE 100 MG: 20 INJECTION, SOLUTION INFILTRATION; PERINEURAL at 12:43

## 2017-12-21 RX ADMIN — POTASSIUM CHLORIDE, DEXTROSE MONOHYDRATE AND SODIUM CHLORIDE: 150; 5; 900 INJECTION, SOLUTION INTRAVENOUS at 08:24

## 2017-12-21 RX ADMIN — CEFAZOLIN SODIUM 2 G: 2 INJECTION, SOLUTION INTRAVENOUS at 12:49

## 2017-12-21 ASSESSMENT — LIFESTYLE VARIABLES: TOBACCO_USE: 1

## 2017-12-21 NOTE — BRIEF OP NOTE
[facility] Brief Operative Note    Pre-operative diagnosis: Cholecystitis with cholelithiasis   Post-operative diagnosis Cholecystitis with cholelithiasis      Procedure: Procedure(s):  LAPAROSCOPIC CHOLECYSTECTOMY  - Wound Class: II-Clean Contaminated   Surgeon(s): Surgeon(s) and Role:     * Denilson Brenner MD - Primary     * Beverly Stoll PA-C - Assisting   Estimated blood loss: 20 mL    Specimens:   ID Type Source Tests Collected by Time Destination   A : GALLBLADDER AND CONTENTS Tissue Gallbladder and Contents SURGICAL PATHOLOGY EXAM Denilson Brenner MD 12/21/2017 12:41 PM       Findings: Same as above      Beverly Stoll PA-C

## 2017-12-21 NOTE — CONSULTS
[facility]  General Surgery Consultation         Denilson Brenner MD    Humera Grey MRN# [id number]   YOB: 1965 Age: 52 year old      Date of Admission:  12/19/2017  Date of Consult: 12/20/2017         Assessment and Plan:   Pleasant 52-year-old female status post ERCP.  I went over the role of surgery in patients with choledocholithiasis and explained that surgery is often recommended but the timing is fairly flexible.  She and her  were very clearly of the mindset that getting her gallbladder out in preventing any future episodes of cholecystitis or choledocholithiasis was desirable.  We will get her on the operating room schedule for tomorrow.  Nothing to eat after midnight.              Code Status:   Full Code         Primary Care Physician:   Majo Velez [phone]         Requesting Physician:      Vita         Chief Complaint:   Abdominal pain, nausea and vomiting    History is obtained from the patient         History of Present Illness:   Humera Grey is a pleasant 52-year-old female with choledocholithiasis.  Had significant abdominal pain upon gastroenterology evaluation and was sent to the ER.  She was found to have elevated bilirubin and LFTs.  She was seen by her GI doctor and ERCP was performed.  Multiple stones were removed and a stent was placed.  We are now asked to evaluate her for cholelithiasis and possible operative intervention.           Past Medical History:   Humera Grey  has a past medical history of Depressive disorder and Gastroesophageal reflux disease.         Past Surgical History:   Humera Grey  has a past surgical history that includes Cystectomy ovarian benign and Mastectomy.         Home Medications:     Prior to Admission medications    Medication Sig Last Dose Taking? Auth Provider   calcium carbonate (TUMS) 500 MG chewable tablet Take 1-2 chew tab by mouth 3 times daily as needed for heartburn prn  Yes Unknown, Entered By History   Ibuprofen (ADVIL PO) Take 200-400 mg by mouth every 6 hours as needed for moderate pain prn Yes Unknown, Entered By History   bismuth subsalicylate (PEPTO BISMOL) 262 MG/15ML suspension Take 15 mLs by mouth every 6 hours as needed for indigestion prn Yes Unknown, Entered By History   PREDNISONE PO Take by mouth daily Pt states started this yesterday, had one dose - called pharmacy but they don't show record of it, so unknown dose 12/18/2017 at Unknown time Yes Unknown, Entered By History            Current Medications:           influenza quadrivalent (PF) vacc age 3 yrs and older  0.5 mL Intramuscular Prior to discharge     pantoprazole  40 mg Intravenous QAM AC     piperacillin-tazobactam  3.375 g Intravenous Q6H     naloxone, naloxone, potassium chloride, potassium chloride, potassium chloride, potassium chloride with lidocaine, acetaminophen, oxyCODONE IR, HYDROmorphone, senna-docusate **OR** senna-docusate, ondansetron **OR** ondansetron, prochlorperazine **OR** prochlorperazine **OR** prochlorperazine, diphenhydrAMINE **OR** diphenhydrAMINE         Allergies:     Allergies   Allergen Reactions     Bupropion Hives     Contrast Dye Hives     Per patient report     Escitalopram Hives     Sertraline      Morphine Hives     Possible allergy     Venlafaxine Rash            Social History:   Humera Grey  reports that she has never smoked. She does not have any smokeless tobacco history on file. She reports that she drinks alcohol. She reports that she does not use illicit drugs.          Family History:   Humera Grey family history is not on file.          Review of Systems:   The 10 point Review of Systems is negative other than noted in the HPI.  No diarrhea or loose stools.  Nausea and emesis as described           Physical Exam:   Blood pressure 115/65, pulse 86, temperature 98.4  F (36.9  C), temperature source Axillary, resp. rate 16, height 1.524 m (5'), weight  62.6 kg (138 lb), SpO2 95 %.  138 lbs 0 oz    Pleasant healthy-appearing female in no distress.  Patient has a pleasant affect and communicates well.   Pupils equal round and reactive to light.   No cervical lymphadenopathy or thyromegaly.   Lung fields clear, breathing comfortably.   Heart normal sinus rhythm.  No murmurs rubs or gallops.  Abdomen soft, mildly distended and tender.  No peritoneal signs.  Scars consistent with surgical history.  Skin warm, dry.  No obvious rashes or lesions.           Data:   All new lab and imaging data was reviewed.   Recent Labs   Lab Test  12/20/17   0930  12/19/17   2045  12/19/17   1610   WBC  8.5   --   11.6*   HGB  12.2   --   15.0   MCV  87   --   83   PLT  249  273  310      Recent Labs   Lab Test  12/20/17   0930  12/19/17   1610   NA  140  134   POTASSIUM  3.9  3.7   CHLORIDE  109  98   CO2  22  25   BUN  13  7   CR  0.42*  0.49*   ANIONGAP  9  11   ANTONETTE  8.3*  9.7   GLC  68*  84

## 2017-12-21 NOTE — ANESTHESIA PREPROCEDURE EVALUATION
Procedure: Procedure(s):  LAPAROSCOPIC CHOLECYSTECTOMY  Preop diagnosis: in house    Allergies   Allergen Reactions     Bupropion Hives     Contrast Dye Hives     Per patient report     Escitalopram Hives     Sertraline      Morphine Hives     Possible allergy     Venlafaxine Rash     Past Medical History:   Diagnosis Date     Depressive disorder      Gastroesophageal reflux disease      Past Surgical History:   Procedure Laterality Date     CYSTECTOMY OVARIAN BENIGN       ENDOSCOPIC RETROGRADE CHOLANGIOPANCREATOGRAM N/A 12/20/2017    Procedure: COMBINED ENDOSCOPIC RETROGRADE CHOLANGIOPANCREATOGRAPHY, REMOVE STONE/BALLOON;  ENDOSCOPIC RETROGRADE CHOLANGIOPANCREATOGRAM, STONE REMOVAL, SPHINCTEROTOMY AND BILE DUCT STENT PLACEMENT  ;  Surgeon: Usman Leonard MD;  Location:  OR     ENDOSCOPIC RETROGRADE CHOLANGIOPANCREATOGRAM N/A 12/20/2017    Procedure: COMBINED ENDOSCOPIC RETROGRADE CHOLANGIOPANCREATOGRAPHY, SPHINCTEROTOMY;;  Surgeon: Usmna Leonard MD;  Location:  OR     ENDOSCOPIC RETROGRADE CHOLANGIOPANCREATOGRAM N/A 12/20/2017    Procedure: COMBINED ENDOSCOPIC RETROGRADE CHOLANGIOPANCREATOGRAPHY, PLACE TUBE/STENT;;  Surgeon: Usman Leonard MD;  Location:  OR     MASTECTOMY       Social History   Substance Use Topics     Smoking status: Former Smoker     Smokeless tobacco: Never Used     Alcohol use Yes      Comment: 5 times a week     Prior to Admission medications    Medication Sig Start Date End Date Taking? Authorizing Provider   calcium carbonate (TUMS) 500 MG chewable tablet Take 1-2 chew tab by mouth 3 times daily as needed for heartburn   Yes Unknown, Entered By History   Ibuprofen (ADVIL PO) Take 200-400 mg by mouth every 6 hours as needed for moderate pain   Yes Unknown, Entered By History   bismuth subsalicylate (PEPTO BISMOL) 262 MG/15ML suspension Take 15 mLs by mouth every 6 hours as needed for indigestion   Yes Unknown, Entered By History   PREDNISONE PO Take by mouth  daily Pt states started this yesterday, had one dose - called pharmacy but they don't show record of it, so unknown dose   Yes Unknown, Entered By History     Current Facility-Administered Medications Ordered in Epic   Medication Dose Route Frequency Last Rate Last Dose     lidocaine 1 % 1 mL  1 mL Other Q1H PRN         sodium chloride (PF) 0.9% PF flush 3 mL  3 mL Intracatheter Q8H         lactated ringers infusion   Intravenous Continuous 25 mL/hr at 12/21/17 1129       influenza quadrivalent (PF) vacc age 3 yrs and older (FLUZONE or Flulaval) injection 0.5 mL  0.5 mL Intramuscular Prior to discharge         [Auto Hold] pantoprazole (PROTONIX) 40 mg IV push injection  40 mg Intravenous QAM AC   40 mg at 12/21/17 0829     [Auto Hold] piperacillin-tazobactam (ZOSYN) 3.375 in 15 mL NS Premix Syringe  3.375 g Intravenous Q6H   3.375 g at 12/21/17 1007     ceFAZolin (ANCEF) intermittent infusion 2 g in 100 mL dextrose PRE-MIX  2 g Intravenous Pre-Op/Pre-procedure x 1 dose         ceFAZolin (ANCEF) intermittent infusion 1 g (pre-mix)  1 g Intravenous See Admin Instructions         dextrose 5% and 0.9% NaCl with potassium chloride 20 mEq infusion   Intravenous Continuous 100 mL/hr at 12/21/17 0824       [Auto Hold] naloxone (NARCAN) injection 0.1-0.4 mg  0.1-0.4 mg Intravenous Q2 Min PRN         [Auto Hold] potassium chloride SA (K-DUR/KLOR-CON M) CR tablet 20-40 mEq  20-40 mEq Oral Q2H PRN         [Auto Hold] potassium chloride (KLOR-CON) Packet 20-40 mEq  20-40 mEq Oral or Feeding Tube Q2H PRN         [Auto Hold] potassium chloride 10 mEq in 100 mL sterile water intermittent infusion (premix)  10 mEq Intravenous Q1H PRN         [Auto Hold] potassium chloride 10 mEq in 100 mL intermittent infusion with 10 mg lidocaine  10 mEq Intravenous Q1H PRN         [Auto Hold] acetaminophen (TYLENOL) tablet 650 mg  650 mg Oral Q4H PRN         [Auto Hold] oxyCODONE IR (ROXICODONE) tablet 5-10 mg  5-10 mg Oral Q3H PRN         [Auto  Hold] HYDROmorphone (PF) (DILAUDID) injection 0.3-0.5 mg  0.3-0.5 mg Intravenous Q2H PRN   0.5 mg at 12/21/17 1029     [Auto Hold] senna-docusate (SENOKOT-S;PERICOLACE) 8.6-50 MG per tablet 1 tablet  1 tablet Oral BID PRN        Or     [Auto Hold] senna-docusate (SENOKOT-S;PERICOLACE) 8.6-50 MG per tablet 2 tablet  2 tablet Oral BID PRN         [Auto Hold] ondansetron (ZOFRAN-ODT) ODT tab 4 mg  4 mg Oral Q6H PRN        Or     [Auto Hold] ondansetron (ZOFRAN) injection 4 mg  4 mg Intravenous Q6H PRN   4 mg at 12/20/17 0828     [Auto Hold] prochlorperazine (COMPAZINE) injection 10 mg  10 mg Intravenous Q6H PRN   10 mg at 12/20/17 1259    Or     [Auto Hold] prochlorperazine (COMPAZINE) tablet 10 mg  10 mg Oral Q6H PRN        Or     [Auto Hold] prochlorperazine (COMPAZINE) Suppository 25 mg  25 mg Rectal Q12H PRN         [Auto Hold] diphenhydrAMINE (BENADRYL) capsule 25 mg  25 mg Oral Q6H PRN        Or     [Auto Hold] diphenhydrAMINE (BENADRYL) injection 25 mg  25 mg Intravenous Q6H PRN         No current [facility]-ordered outpatient prescriptions on file.       lactated ringers 25 mL/hr at 12/21/17 1129     dextrose 5% and 0.9% NaCl with potassium chloride 20 mEq 100 mL/hr at 12/21/17 0824     Wt Readings from Last 1 Encounters:   12/19/17 62.6 kg (138 lb)     Temp Readings from Last 1 Encounters:   12/21/17 36.9  C (98.5  F) (Oral)     BP Readings from Last 6 Encounters:   12/21/17 129/73     Pulse Readings from Last 4 Encounters:   12/20/17 86     Resp Readings from Last 1 Encounters:   12/21/17 16     SpO2 Readings from Last 1 Encounters:   12/21/17 96%     Recent Labs   Lab Test  12/21/17   0938  12/20/17   0930   NA  137  140   POTASSIUM  3.7  3.9   CHLORIDE  104  109   CO2  28  22   ANIONGAP  5  9   GLC  92  68*   BUN  6*  13   CR  0.49*  0.42*   ANTONETTE  8.3*  8.3*     Recent Labs   Lab Test  12/21/17   0938  12/20/17   0930  12/19/17   1610   AST  209*  466*  578*   ALT  646*  856*  1228*   ALKPHOS  225*  227*  254*    BILITOTAL  1.6*  4.4*  6.1*   LIPASE   --    --   175     Recent Labs   Lab Test  12/21/17   0938  12/20/17   0930   WBC  13.2*  8.5   HGB  12.6  12.2   PLT  259  249     No results for input(s): ABO, RH in the last 89252 hours.  Recent Labs   Lab Test  12/21/17   0938   INR  0.94      No results for input(s): TROPI in the last 40395 hours.  No results for input(s): PH, PCO2, PO2, HCO3 in the last 45651 hours.  Recent Labs   Lab Test  12/20/17   0805   HCG  Negative     Recent Results (from the past 744 hour(s))   Abdomen US, limited (RUQ only)    Narrative    RIGHT UPPER QUADRANT ULTRASOUND 12/19/2017 6:35 PM    HISTORY:  Right upper quadrant pain    COMPARISON: None.    FINDINGS:    Gallbladder: Cholelithiasis filling the gallbladder with shadowing  obscuring all of the gallbladder except for the anterior wall which is  borderline 0.3 cm in thickness. Patient is tender in the right upper  quadrant with positive sonographic Jules's.    Bile ducts:  0.75 cm common bile duct is prominent. No intrahepatic  duct dilatation.    Liver:  Increased echogenicity consistent with fatty infiltration.    Pancreas:  Almost completely obscured, no gross abnormality in the  pancreas region.    Right kidney:  Lower pole right kidney partially obscured by bowel  gas, visualized right kidney portions are normal approximately 10.4 cm  in length      Impression    IMPRESSION:    1. Cholelithiasis filling the gallbladder. There is gallbladder wall  thickness upper normal at 0.3 cm. Positive sonographic Jules's sign.  With tenderness over the gallbladder, early cholecystitis cannot be  excluded although gallbladder wall is only borderline thickened.  2. Prominent 0.7 cm common hepatic duct.  3. Fatty infiltration of the liver.    CELIA IZQUIERDO MD   XR ERCP    Narrative    XR ERCP   12/20/2017 8:40 AM     HISTORY: ercp. Fluoro time 0.8, 8 images, OR 51;     COMPARISON: None.      Impression    IMPRESSION: 8 spot fluoroscopic images  were obtained during an ERCP.  Please refer to the gastroenterology report for further specifics.    JL LACKEY MD       RECENT LABS:   ECG:   ECHO:     Anesthesia Evaluation     . Pt has had prior anesthetic.     No history of anesthetic complications          ROS/MED HX    ENT/Pulmonary:     (+)tobacco use, Past use , . .   (-) sleep apnea   Neurologic:       Cardiovascular:         METS/Exercise Tolerance:     Hematologic:         Musculoskeletal:         GI/Hepatic:     (+) GERD cholecystitis/cholelithiasis,       Renal/Genitourinary:         Endo:     (+) Obesity, .      Psychiatric:         Infectious Disease:         Malignancy:         Other:                     Physical Exam  Normal systems: cardiovascular, pulmonary and dental    Airway   Mallampati: I  Neck ROM: full    Dental     Cardiovascular       Pulmonary                     Anesthesia Plan      History & Physical Review  History and physical reviewed and following examination; no interval change.    ASA Status:  3 .    NPO Status:  > 8 hours    Plan for General with Intravenous induction. Maintenance will be Balanced.    PONV prophylaxis:  Ondansetron (or other 5HT-3)  Additional equipment: Videolaryngoscope Had ercp yesterday      Postoperative Care  Postoperative pain management:  IV analgesics.      Consents  Anesthetic plan, risks, benefits and alternatives discussed with:  Patient and Spouse..                          .

## 2017-12-21 NOTE — OP NOTE
General Surgery Operative Note    PREOPERATIVE DIAGNOSIS: Cholecystitis with choledocholithiasis    POSTOPERATIVE DIAGNOSIS: Same    PROCEDURE:   Procedure(s):  LAPAROSCOPIC CHOLECYSTECTOMY    ANESTHESIA:  General.    PREOPERATIVE MEDICATIONS:  Ancef IV.    SURGEON:  Denilson Brenner MD    ASSISTANT:  Beverly Stoll PA-C  A first assistant was necessary owing to challenging laparoscopic visualization and exposure.  Retraction was also necessary.    INDICATIONS: Patient presented with infection and cholecystitis.  Was also found to have choledocholithiasis and ERCP.  Now presents for cholecystectomy.    PROCEDURE:  The patient was taken to the operating suite and uneventfully endotracheally intubated.  The abdomen was prepped and draped in a sterile fashion.  Surgeon initiated timeout was acknowledged.  We entered the abdomen in the left upper quadrant using Visiport technique.  Three other trocars were placed under laparoscopic visualization.  We elevated the liver and were able to identify a very inflamed and edematous gallbladder.  The gallbladder was grasped and used to elevate the liver further.  We began dissecting out some fatty adhesions down near the neck of the gallbladder until a cystic duct was encountered.  We continued our dissection using combination of sharp and blunt dissection until the cystic duct was largely dissected out.  We continued our dissection up along the sides of the gallbladder, both medially and laterally, until we had created a space between the gallbladder and the liver.  At this point, we encountered the cystic artery, just posterior and lateral to the cystic duct.  This again was dissected out.  Once we had created a window where only the cystic artery and duct were noted to be entering the gallbladder, we felt that this represented our critical view.  The cystic artery was then doubly clipped and divided.  The cystic duct was enlarged and inflamed.  I elected to swap out the left  upper quadrant port for a 12 mm port and used an Endo RUI staple load to divide the cystic duct.  This was done uneventfully.  We continued our dissection up along the body of the gallbladder, freeing all attachments and adhesions of the gallbladder to the liver.  Gallbladder was removed from the liver in an atraumatic fashion.  The gallbladder was then brought up through the umbilical port site and removed from the abdomen.  The gallbladder fossa was reinspected, and all areas of bleeding were managed with electrocautery.  We irrigated the area with normal saline and aspirated it out.  We then removed the umbilical port trocar and closed the fascia with a figure-of-eight 0 Vicryl suture.  This was done using the Bruce-Bess device.  The left upper quadrant port was closed in a similar fashion.  We then reinspected the abdomen, and everything appeared to be in pristine condition.  We removed the trocars under laparoscopic visualization and desufflated the abdomen with the Lis suction .  The skin edges were reapproximated with 4-0 Vicryl and Steri-Strips.  The patient was uneventfully extubated, awakened and taken to the PACU in stable condition.  At the conclusion of the case, all lap and needle counts were correct.      ESTIMATED BLOOD LOSS: 20 mL    INTRAOPERATIVE FINDINGS: Inflamed and edematous gallbladder with multiple stones and evidence of cholecystitis.    Denilson Brenner MD, MD

## 2017-12-21 NOTE — PROGRESS NOTES
Patient states she was told by Dr. Brenner she could have clear liquids this evening.  No orders were placed.  Spoke with on-call Dr. Aguirre.  Reviewed ERCP report with him and he stated she could have sips of clear liquids with NPO after midnight.  She is to stop drinking liquids if any increased pain.

## 2017-12-21 NOTE — ANESTHESIA POSTPROCEDURE EVALUATION
Patient: Humera Grey    Procedure(s):  LAPAROSCOPIC CHOLECYSTECTOMY  - Wound Class: II-Clean Contaminated    Diagnosis:in house  Diagnosis Additional Information: No value filed.    Anesthesia Type:  General    Note:  Anesthesia Post Evaluation    Patient location during evaluation: PACU  Patient participation: Able to fully participate in evaluation  Level of consciousness: awake and alert  Pain management: adequate  Airway patency: patent  Cardiovascular status: acceptable  Respiratory status: acceptable  Hydration status: acceptable  PONV: none and controlled     Anesthetic complications: None          Last vitals:  Vitals:    12/21/17 1029 12/21/17 1356 12/21/17 1400   BP:  139/73 132/81   Pulse:      Resp: 16 14 24   Temp:  36.8  C (98.2  F)    SpO2:  98% 94%         Electronically Signed By: Haris Blunt MD  December 21, 2017  2:17 PM

## 2017-12-21 NOTE — PROVIDER NOTIFICATION
MD Notification    Notified Person:  MD    Notified Persons Name: Dr. Torres    Notification Date/Time: 12/20/17 1932    Notification Interaction:  Talked with Physician    Purpose of Notification: Glucose this morning 68 and now is 58. Pt asymptomatic and not diabetic.    Orders Received: Yes    Comments: Pt has been NPO all day

## 2017-12-21 NOTE — PLAN OF CARE
Problem: Patient Care Overview  Goal: Plan of Care/Patient Progress Review  Outcome: No Change  Pt A&O VSS RA. Up  SBA . Pt c/o abdominal pain gave Iv dilaudid X1 effective. Pt walks in the hallway this sift tolerated. Sip water diet. Pt has low blood sugar episodes this shift a symptomatic. Gave 1/2 cup of apple juice contacted on call.  IVF change per plan of care. Last ast Bs 96  Continue monitor

## 2017-12-21 NOTE — PROGRESS NOTES
[facility]  Hospitalist Progress Note   12/21/2017          Assessment and Plan:     Humera Grey is a 52 year old female admitted on 12/19 with severe RUQ abdominal pain. US imaging consistent with Cholelithiasis and dilated CBD. On 12/20, underwent ERCP with  biliary sphincterotomy and  balloon extraction with stent in CBD. This afternoon planned for emergent cholecystectomy.     1. Choledocholithiasis status post ERCP with  biliary sphincterotomy and  balloon extraction, stent in CBD.  2. Acute Ascending Cholangitis   3. Mild asymptomatic hypoglycemia- due to no oral intake, improved   Patient admits to significant improvement in her abdominal pain.   Currently 4/10 in intensity. Planned for cholecystectomy by surgical team.  Appreciate  gastroenterology evaluation.   Repeat ERCP in 3 months to remove stent.   Pain management with IV Dilaudid/oral oxycodone as tolerated.  Zofran as needed for nausea and vomiting.  D5 NS KCl at 100 ML per hour  Zosyn for cholangitis.   IV Protonix for G.I. Prophylaxis.    4. Depression. At baseline. Not on medications.  5. GERD continue IV Protonix.  6. Mild ankle edema- due to IV fluids. No crackles. Will consider diuretics if continues to have edema or if having crackles in lungs.     Active Diet Order NPO for Medical/Clinical Reasons Except for: Ice Chips    DVT Prophylaxis: SCD  Code Status: Full Code  Disposition: Expected discharge tommorow if stable      Patient, family, interdisciplinary team involved in care and agrees with plan.  Total time - Greater than 25 min. More than 50% of time spent in direct patient care, care coordination, patient/caregiver counseling, and formalizing plan of care.     Payton Gorman MD      Interval History:      Patient admits to significant improvement in her abdominal pain. Currently 4/10 in intensity.   Planned for cholecystectomy by surgical team.  No nausea or vomiting.  Complains of swelling of feet.               Physical Exam:        Vital Signs with Ranges  Temp:  [97.3  F (36.3  C)-98.5  F (36.9  C)] 98.5  F (36.9  C)  Heart Rate:  [78-92] 92  Resp:  [16-18] 16  BP: (115-145)/(65-90) 129/73  SpO2:  [95 %-96 %] 96 %    Intake/Output Summary (Last 24 hours) at 12/21/17 1158  Last data filed at 12/21/17 0930   Gross per 24 hour   Intake             1283 ml   Output              825 ml   Net              458 ml     Admission Weight: 62.6 kg (138 lb)  Current Weight: 62.6 kg (138 lb)  GENERAL: Patient is in no distress. Alert and oriented.  HEART: Regular rate and rhythm. S1S2. No murmurs, gallops or rubs noted.  LUNGS: Clear to auscultation bilaterally. No expiratory wheeze.   ABDOMEN: Soft, abdominal tenderness with palpation. Active bowel sounds in all 4 quadrants.  EXTREMITIES: trace pedal edema. Fullness around ankles 2+ peripheral pulses.         Medications:          sodium chloride (PF)  3 mL Intracatheter Q8H     influenza quadrivalent (PF) vacc age 3 yrs and older  0.5 mL Intramuscular Prior to discharge     [Auto Hold] pantoprazole  40 mg Intravenous QAM AC     [Auto Hold] piperacillin-tazobactam  3.375 g Intravenous Q6H     ceFAZolin  2 g Intravenous Pre-Op/Pre-procedure x 1 dose     ceFAZolin  1 g Intravenous See Admin Instructions     lidocaine (buffered or not buffered), [Auto Hold] naloxone, [Auto Hold] potassium chloride, [Auto Hold] potassium chloride, [Auto Hold] potassium chloride, [Auto Hold] potassium chloride with lidocaine, [Auto Hold] acetaminophen, [Auto Hold] oxyCODONE IR, [Auto Hold] HYDROmorphone, [Auto Hold] senna-docusate **OR** [Auto Hold] senna-docusate, [Auto Hold] ondansetron **OR** [Auto Hold] ondansetron, [Auto Hold] prochlorperazine **OR** [Auto Hold] prochlorperazine **OR** [Auto Hold] prochlorperazine, [Auto Hold] diphenhydrAMINE **OR** [Auto Hold] diphenhydrAMINE         Data:        Recent Results (from the past 24 hour(s))   Glucose by meter    Collection Time: 12/20/17  7:22 PM    Result Value Ref Range    Glucose 58 (L) 70 - 99 mg/dL   Glucose by meter    Collection Time: 12/20/17  8:24 PM   Result Value Ref Range    Glucose 74 70 - 99 mg/dL   Glucose by meter    Collection Time: 12/20/17 10:30 PM   Result Value Ref Range    Glucose 96 70 - 99 mg/dL   Glucose by meter    Collection Time: 12/21/17  3:02 AM   Result Value Ref Range    Glucose 107 (H) 70 - 99 mg/dL   CBC with platelets    Collection Time: 12/21/17  9:38 AM   Result Value Ref Range    WBC 13.2 (H) 4.0 - 11.0 10e9/L    RBC Count 4.47 3.8 - 5.2 10e12/L    Hemoglobin 12.6 11.7 - 15.7 g/dL    Hematocrit 38.6 35.0 - 47.0 %    MCV 86 78 - 100 fl    MCH 28.2 26.5 - 33.0 pg    MCHC 32.6 31.5 - 36.5 g/dL    RDW 15.0 10.0 - 15.0 %    Platelet Count 259 150 - 450 10e9/L   Comprehensive metabolic panel    Collection Time: 12/21/17  9:38 AM   Result Value Ref Range    Sodium 137 133 - 144 mmol/L    Potassium 3.7 3.4 - 5.3 mmol/L    Chloride 104 94 - 109 mmol/L    Carbon Dioxide 28 20 - 32 mmol/L    Anion Gap 5 3 - 14 mmol/L    Glucose 92 70 - 99 mg/dL    Urea Nitrogen 6 (L) 7 - 30 mg/dL    Creatinine 0.49 (L) 0.52 - 1.04 mg/dL    GFR Estimate >90 >60 mL/min/1.7m2    GFR Estimate If Black >90 >60 mL/min/1.7m2    Calcium 8.3 (L) 8.5 - 10.1 mg/dL    Bilirubin Total 1.6 (H) 0.2 - 1.3 mg/dL    Albumin 3.0 (L) 3.4 - 5.0 g/dL    Protein Total 6.6 (L) 6.8 - 8.8 g/dL    Alkaline Phosphatase 225 (H) 40 - 150 U/L     (HH) 0 - 50 U/L     (H) 0 - 45 U/L   INR    Collection Time: 12/21/17  9:38 AM   Result Value Ref Range    INR 0.94 0.86 - 1.14      Labs:  Lab Results   Component Value Date    WBC 13.2 (H) 12/21/2017    HGB 12.6 12/21/2017    HCT 38.6 12/21/2017    MCV 86 12/21/2017     12/21/2017       Lab Results   Component Value Date     12/21/2017    BUN 6 (L) 12/21/2017    ANIONGAP 5 12/21/2017       Recent Labs   Lab Test  12/21/17   0938   INR  0.94       Lab Results   Component Value Date     (HH) 12/21/2017      (H) 12/21/2017     (H) 12/20/2017    ALKPHOS 225 (H) 12/21/2017     Imaging:  Abdomen Us, Limited (ruq Only)    Result Date: 12/19/2017  RIGHT UPPER QUADRANT ULTRASOUND 12/19/2017 6:35 PM HISTORY:  Right upper quadrant pain COMPARISON: None. FINDINGS:  Gallbladder: Cholelithiasis filling the gallbladder with shadowing obscuring all of the gallbladder except for the anterior wall which is borderline 0.3 cm in thickness. Patient is tender in the right upper quadrant with positive sonographic Jules's. Bile ducts:  0.75 cm common bile duct is prominent. No intrahepatic duct dilatation. Liver:  Increased echogenicity consistent with fatty infiltration. Pancreas:  Almost completely obscured, no gross abnormality in the pancreas region. Right kidney:  Lower pole right kidney partially obscured by bowel gas, visualized right kidney portions are normal approximately 10.4 cm in length     IMPRESSION:  1. Cholelithiasis filling the gallbladder. There is gallbladder wall thickness upper normal at 0.3 cm. Positive sonographic Jules's sign. With tenderness over the gallbladder, early cholecystitis cannot be excluded although gallbladder wall is only borderline thickened. 2. Prominent 0.7 cm common hepatic duct. 3. Fatty infiltration of the liver. CELIA IZQUIERDO MD     ERCP Impression: - Choledocholithiasis was found. Complete removal                             was accomplished by biliary sphincterotomy and                             balloon extraction.                             - A biliary sphincterotomy was performed.                             - The biliary tree was swept and pus was found.                             Ascending Cholangitis.                             - One temporary stent was placed into the common                             bile duct.

## 2017-12-21 NOTE — PLAN OF CARE
Problem: Patient Care Overview  Goal: Plan of Care/Patient Progress Review  Outcome: No Change  A&Ox4, VSS, medicated for pain q2 hours.  C/O RUQ abd pain.  D5NS with 20K+ @ 100 for hypoglycemic episode on previous shift.   @ 0200.  Standby assist to bathroom.  NPO since [place], plan is to OR today for cholecystectomy.

## 2017-12-21 NOTE — ANESTHESIA CARE TRANSFER NOTE
Patient: Humera Grey    Procedure(s):  LAPAROSCOPIC CHOLECYSTECTOMY  - Wound Class: II-Clean Contaminated    Diagnosis: in house  Diagnosis Additional Information: No value filed.    Anesthesia Type:   General     Note:  Airway :Face Mask  Patient transferred to:PACU  Comments: Pt awake and able to verbalize needs. ALL monitors on and audible. Vital signs stable. Spontaneous respiration without difficulty. o2 sats 96% on 10Lo2 per face mask. Pt denies pain. Report given to PACU RN.Handoff Report: Identifed the Patient, Identified the Reponsible Provider, Reviewed the pertinent medical history, Discussed the surgical course, Reviewed Intra-OP anesthesia mangement and issues during anesthesia, Set expectations for post-procedure period and Allowed opportunity for questions and acknowledgement of understanding      Vitals: (Last set prior to Anesthesia Care Transfer)    CRNA VITALS  12/21/2017 1326 - 12/21/2017 1403      12/21/2017             Resp Rate (observed): 10                Electronically Signed By: ALLA Marie CRNA  December 21, 2017  2:03 PM

## 2017-12-22 VITALS
TEMPERATURE: 98 F | DIASTOLIC BLOOD PRESSURE: 89 MMHG | WEIGHT: 138 LBS | RESPIRATION RATE: 18 BRPM | HEART RATE: 92 BPM | OXYGEN SATURATION: 95 % | SYSTOLIC BLOOD PRESSURE: 132 MMHG | HEIGHT: 60 IN | BODY MASS INDEX: 27.09 KG/M2

## 2017-12-22 LAB
ALBUMIN SERPL-MCNC: 2.6 G/DL (ref 3.4–5)
ALP SERPL-CCNC: 165 U/L (ref 40–150)
ALT SERPL W P-5'-P-CCNC: 424 U/L (ref 0–50)
ANION GAP SERPL CALCULATED.3IONS-SCNC: 4 MMOL/L (ref 3–14)
AST SERPL W P-5'-P-CCNC: 110 U/L (ref 0–45)
BILIRUB SERPL-MCNC: 1 MG/DL (ref 0.2–1.3)
BUN SERPL-MCNC: 3 MG/DL (ref 7–30)
CALCIUM SERPL-MCNC: 8 MG/DL (ref 8.5–10.1)
CHLORIDE SERPL-SCNC: 102 MMOL/L (ref 94–109)
CK SERPL-CCNC: 79 U/L (ref 30–225)
CO2 SERPL-SCNC: 31 MMOL/L (ref 20–32)
CREAT SERPL-MCNC: 0.48 MG/DL (ref 0.52–1.04)
ERYTHROCYTE [DISTWIDTH] IN BLOOD BY AUTOMATED COUNT: 15.4 % (ref 10–15)
GFR SERPL CREATININE-BSD FRML MDRD: >90 ML/MIN/1.7M2
GLUCOSE SERPL-MCNC: 97 MG/DL (ref 70–99)
HCT VFR BLD AUTO: 35.7 % (ref 35–47)
HGB BLD-MCNC: 11.5 G/DL (ref 11.7–15.7)
MCH RBC QN AUTO: 28 PG (ref 26.5–33)
MCHC RBC AUTO-ENTMCNC: 32.2 G/DL (ref 31.5–36.5)
MCV RBC AUTO: 87 FL (ref 78–100)
PLATELET # BLD AUTO: 243 10E9/L (ref 150–450)
POTASSIUM SERPL-SCNC: 3.6 MMOL/L (ref 3.4–5.3)
PROT SERPL-MCNC: 6.5 G/DL (ref 6.8–8.8)
RBC # BLD AUTO: 4.1 10E12/L (ref 3.8–5.2)
SODIUM SERPL-SCNC: 137 MMOL/L (ref 133–144)
WBC # BLD AUTO: 13.3 10E9/L (ref 4–11)

## 2017-12-22 PROCEDURE — 82550 ASSAY OF CK (CPK): CPT | Performed by: HOSPITALIST

## 2017-12-22 PROCEDURE — 36415 COLL VENOUS BLD VENIPUNCTURE: CPT | Performed by: HOSPITALIST

## 2017-12-22 PROCEDURE — 25000132 ZZH RX MED GY IP 250 OP 250 PS 637: Performed by: INTERNAL MEDICINE

## 2017-12-22 PROCEDURE — 25000125 ZZHC RX 250: Performed by: HOSPITALIST

## 2017-12-22 PROCEDURE — 99239 HOSP IP/OBS DSCHRG MGMT >30: CPT | Performed by: HOSPITALIST

## 2017-12-22 PROCEDURE — 85027 COMPLETE CBC AUTOMATED: CPT | Performed by: HOSPITALIST

## 2017-12-22 PROCEDURE — 25000128 H RX IP 250 OP 636: Performed by: INTERNAL MEDICINE

## 2017-12-22 PROCEDURE — 80053 COMPREHEN METABOLIC PANEL: CPT | Performed by: HOSPITALIST

## 2017-12-22 PROCEDURE — 25800025 ZZH RX 258: Performed by: PHYSICIAN ASSISTANT

## 2017-12-22 RX ORDER — PANTOPRAZOLE SODIUM 40 MG/1
40 TABLET, DELAYED RELEASE ORAL
Status: DISCONTINUED | OUTPATIENT
Start: 2017-12-23 | End: 2017-12-22 | Stop reason: HOSPADM

## 2017-12-22 RX ORDER — OXYCODONE HYDROCHLORIDE 5 MG/1
5-10 TABLET ORAL
Qty: 20 TABLET | Refills: 0 | Status: SHIPPED | OUTPATIENT
Start: 2017-12-22

## 2017-12-22 RX ORDER — BISACODYL 10 MG
10 SUPPOSITORY, RECTAL RECTAL DAILY PRN
Qty: 1 SUPPOSITORY | Refills: 1 | Status: SHIPPED | OUTPATIENT
Start: 2017-12-22 | End: 2018-03-28

## 2017-12-22 RX ADMIN — Medication 0.5 MG: at 00:27

## 2017-12-22 RX ADMIN — PANTOPRAZOLE SODIUM 40 MG: 40 INJECTION, POWDER, FOR SOLUTION INTRAVENOUS at 08:08

## 2017-12-22 RX ADMIN — PIPERACILLIN SODIUM AND TAZOBACTAM SODIUM 3.38 G: 36; 4.5 INJECTION, POWDER, FOR SOLUTION INTRAVENOUS at 10:38

## 2017-12-22 RX ADMIN — OXYCODONE HYDROCHLORIDE 10 MG: 5 TABLET ORAL at 06:19

## 2017-12-22 RX ADMIN — PIPERACILLIN SODIUM AND TAZOBACTAM SODIUM 3.38 G: 36; 4.5 INJECTION, POWDER, FOR SOLUTION INTRAVENOUS at 04:06

## 2017-12-22 RX ADMIN — POTASSIUM CHLORIDE, DEXTROSE MONOHYDRATE AND SODIUM CHLORIDE: 150; 5; 900 INJECTION, SOLUTION INTRAVENOUS at 05:32

## 2017-12-22 RX ADMIN — Medication 0.5 MG: at 08:17

## 2017-12-22 RX ADMIN — OXYCODONE HYDROCHLORIDE 10 MG: 5 TABLET ORAL at 02:15

## 2017-12-22 RX ADMIN — ACETAMINOPHEN 650 MG: 325 TABLET, FILM COATED ORAL at 10:43

## 2017-12-22 NOTE — PLAN OF CARE
Problem: Patient Care Overview  Goal: Plan of Care/Patient Progress Review  Outcome: Adequate for Discharge Date Met: 12/22/17  A&OX4, calm and coop. Up ind in room, amb in keys with spouse. VSS. C/O pain at incision sites, PRN meds given. Advancing diet, IVF's stopped. Voiding adeq. Lap brenton sites x4, CDI, zosyn given. DC to home, observe until 1500 per MD.

## 2017-12-22 NOTE — PLAN OF CARE
Problem: Patient Care Overview  Goal: Plan of Care/Patient Progress Review  Outcome: No Change  Pt is A&Ox4, up with SBA, VSS on 3L O2, capnography in place. C/o abdominal pain 7-8/10, prn dilaudid given with relief. C/o mild nausea upon arrival from PACU at approx 1600, PRN zofran given with relief. Some nausea with clear liquids. 4 lap sites, CDI. BS hypoactive. Ambulated x1 around nurses station. Flatus +, Continue to monitor.     Educated pt on pain control PO versus IV, Pt wanted to try PO oxycodone for pain management with IV dilaudid for breakthrough pain. PO oxy x1 given.

## 2017-12-22 NOTE — DISCHARGE INSTRUCTIONS
Aggressive incentives spirometry  Ambulate as tolerated at least 3 to 4 times a day  Repeat ERCP in 3 months to remove stent.

## 2017-12-22 NOTE — PLAN OF CARE
Problem: Patient Care Overview  Goal: Plan of Care/Patient Progress Review  Outcome: No Change  Pt is A&Ox4, up with SBA, VSS, weaned to 1.5L O2, capnography in place. C/o abdominal pain 7-8/10, prn dilaudid given x1 for breakthrough pain, with relief. Denies nausea. 4 lap sites, steri strips CD&I. BS hypoactive. Ambulated x1 around nurses station. Pt denies nausea.

## 2017-12-22 NOTE — PROGRESS NOTES
[facility]    General Surgery  Daily Post-Op Note       Assessment and Plan:   Humera Grey is a 52 year old female S/P Procedure(s):  LAPAROSCOPIC CHOLECYSTECTOMY, 1 Day Post-Op    Pain: minimal, controlled with Tylenol, occasional oxycodone  Diet: tolerating regular diet with no nausea  IVFs: saline lock  Activity: encourage ambulation 3-4x daily, use of IS  DVT prophylaxis: PCD and ambulation   Dispo: home today if ok with medicine        Interval History:   Pt sitting up in bed.  Reports feeling much better.  Still has some pain in RUQ, and at incisions, worse with movement.  Pain is mainly controlled with Tylenol.  Tolerating diet.  Reports constipation since before admission - would prefer to try suppository at home.  Discharge instructions were discussed with patient and .            Physical Exam:   Temp: 98  F (36.7  C) Temp src: Oral BP: 132/89 Pulse: 92 Heart Rate: 87 Resp: 18 SpO2: 95 % O2 Device: None (Room air) Oxygen Delivery: 3 LPM    I/O last 3 completed shifts:  In: 2178 [P.O.:240; I.V.:1938]  Out: 395 [Urine:375; Blood:20]      Constitutional: alert and no distress   Abdomen: Abdomen soft, non-tender. Band-aids removed, steri strips in place - clean/dry/intact        Data     Recent Labs  Lab 12/22/17  1002 12/21/17  0938 12/20/17  0930   WBC 13.3* 13.2* 8.5   HGB 11.5* 12.6 12.2   MCV 87 86 87    259 249   INR  --  0.94  --     137 140   POTASSIUM 3.6 3.7 3.9   CHLORIDE 102 104 109   CO2 31 28 22   BUN 3* 6* 13   CR 0.48* 0.49* 0.42*   ANIONGAP 4 5 9   ANTONETTE 8.0* 8.3* 8.3*   GLC 97 92 68*   ALBUMIN 2.6* 3.0* 2.9*   PROTTOTAL 6.5* 6.6* 6.2*   BILITOTAL 1.0 1.6* 4.4*   ALKPHOS 165* 225* 227*   * 646* 856*   * 209* 466*       Beverly Stoll PA-C

## 2017-12-22 NOTE — PROGRESS NOTES
Pt up ind, refused Capnography once awake and alert more. Amb in keys, requested to sit in lounge with spouse. RA sats 95%. Last PO pain med was tylenol. Pt hopeful to DC today.

## 2017-12-22 NOTE — DISCHARGE SUMMARY
Discharge Summary  Hospitalist    Date of Admission:  12/19/2017  Date of Discharge:  12/22/2017  Discharging Provider: Payton Gorman MD    Primary Care Physician   JOAN BARBOZA  Primary Care Provider Phone Number: [phone]  Primary Care Provider Fax Number: [phone]    PRINCIPAL DIAGNOSIS  status post lasproscopic cholecystectomy POD #1`  Choledocholithiasis status post ERCP with  biliary sphincterotomy and  balloon extraction, stent in CBD.  Acute Ascending Cholangitis s/p cholecystectomy   Mild asymptomatic hypoglycemia - resolved     Past Medical History:   Diagnosis Date     Depressive disorder      Gastroesophageal reflux disease      History of Present Illness   Humera Grey is a 52 year old female admitted on 12/19 with severe RUQ abdominal pain. US imaging consistent with Cholelithiasis and dilated CBD. On 12/20, underwent ERCP with  biliary sphincterotomy and  balloon extraction with stent in CBD. Underwent emergent laproscopic cholecystectomy on 12/21/2017.     1. Choledocholithiasis status post ERCP with  biliary sphincterotomy and  balloon extraction, stent in CBD.  2. Acute Ascending Cholangitis   3. Mild asymptomatic hypoglycemia- due to no oral intake, improved   Patient admits to significant improvement in her abdominal pain. Last taken intravenous Dilaudid this morning at 830. Complained of abdominal distention. Evaluated by surgical team this morning and cleared for discharge.  Appreciate  gastroenterology and surgical assistance  Repeat ERCP in 3 months to remove stent.   Pain management with oxycodone/Tylenol as tolerated.    4. Depression. At baseline. Not on medications.  5. GERD not on medications at home  Mild ankle edema- due to IV fluids. No crackles. Discussed on ambulating as tolerated.    Patient, her , interdisciplinary team involved in care agree with discharge plan.     Payton Gorman MD    Unresulted Labs Ordered in the Past 30 Days of this Admission      No orders found from 10/20/2017 to 12/20/2017.        Physical Exam   Vitals:    12/19/17 1540   Weight: 62.6 kg (138 lb)     Vital Signs with Ranges  Temp:  [98  F (36.7  C)-99.9  F (37.7  C)] 98  F (36.7  C)  Pulse:  [92] 92  Heart Rate:  [] 87  Resp:  [16-28] 18  BP: (108-138)/(56-89) 132/89  SpO2:  [91 %-97 %] 95 %  I/O last 3 completed shifts:  In: 2178 [P.O.:240; I.V.:1938]  Out: 395 [Urine:375; Blood:20]  PHYSICAL EXAM  GENERAL: Patient is in no distress. Alert and oriented.  HEART: Regular rate and rhythm. S1S2. No murmurs, gallops or rubs noted.  LUNGS: Clear to auscultation bilaterally. No expiratory wheeze.   ABDOMEN: Soft, abdominal tenderness with palpation.  steri strips in place - clean/dry/intact     Active bowel sounds in all 4 quadrants.  EXTREMITIES: trace pedal edema. Fullness around ankles 2+ peripheral pulses.    DISCHARGE DISPOSITION  Home with family support     )Consultations This Hospital Stay   GASTROENTEROLOGY IP CONSULT  GASTROENTEROLOGY IP CONSULT  SURGERY GENERAL IP CONSULT    Time Spent on this Encounter   I, Payton Gorman, personally saw the patient today and spent greater than 30 minutes discharging this patient.    More than 50% of time spent in direct patient care, care coordination, patient/caregiver counseling, and formalizing plan of care.  Discharge Orders     Reason for your hospital stay   You had your gallbladder removed     Follow-up and recommended labs and tests    Follow up with Dr. Brenner or PA, at [address] [facility] [address], [place] [address], within 2 weeks, call office for appointment at [phone].    IF you are doing well and have no questions or concerns, you may call our nurse to update on your condition instead of coming in for appointment.     Activity   Your activity upon discharge: activity as tolerated and no heavy lifting for 2 weeks     Reason for your hospital stay   You were admitted with abdominal pain and had surgery to remove the gall bladder      Follow-up and recommended labs and tests    Follow up with primary care provider, JOAN BARBOZA, within 7 days for hospital follow- up.  The following labs/tests are recommended: liver function tests.   Aggressive incentives spirometry  Ambulate as tolerated at least 3 to 4 times a day  Repeat ERCP in 3 months to remove stent.     Activity   Your activity upon discharge: activity as tolerated.  Do not drive while taking narcotics     When to contact your care team   Call your primary doctor if you have any of the following: temperature greater than 101 and having abdominal pain.     Full Code     Diet   Follow this diet upon discharge: Regular     Diet   Follow this diet upon discharge: Orders Placed This Encounter     Advance Diet as Tolerated: Regular Diet Adult       Discharge Medications   Current Discharge Medication List      START taking these medications    Details   oxyCODONE IR (ROXICODONE) 5 MG tablet Take 1-2 tablets (5-10 mg) by mouth every 3 hours as needed for moderate to severe pain  Qty: 20 tablet, Refills: 0    Associated Diagnoses: Choledocholithiasis with acute cholecystitis      bisacodyl (DULCOLAX) 10 MG Suppository Place 1 suppository (10 mg) rectally daily as needed for constipation  Qty: 1 suppository, Refills: 1    Associated Diagnoses: Choledocholithiasis with acute cholecystitis         CONTINUE these medications which have NOT CHANGED    Details   calcium carbonate (TUMS) 500 MG chewable tablet Take 1-2 chew tab by mouth 3 times daily as needed for heartburn      Ibuprofen (ADVIL PO) Take 200-400 mg by mouth every 6 hours as needed for moderate pain      bismuth subsalicylate (PEPTO BISMOL) 262 MG/15ML suspension Take 15 mLs by mouth every 6 hours as needed for indigestion         STOP taking these medications       PREDNISONE PO Comments:   Reason for Stopping:             Allergies   Allergies   Allergen Reactions     Bupropion Hives     Contrast Dye Hives     Per patient report      Escitalopram Hives     Sertraline      Morphine Hives     Possible allergy     Venlafaxine Rash       Lab Results   Component Value Date    WBC 13.3 (H) 12/22/2017    HGB 11.5 (L) 12/22/2017    HCT 35.7 12/22/2017    MCV 87 12/22/2017     12/22/2017       Lab Results   Component Value Date     12/22/2017    BUN 3 (L) 12/22/2017    ANIONGAP 4 12/22/2017       Lab Results   Component Value Date     (H) 12/22/2017     (H) 12/22/2017     (H) 12/20/2017    ALKPHOS 165 (H) 12/22/2017

## 2017-12-26 LAB — COPATH REPORT: NORMAL

## 2018-01-11 ENCOUNTER — TRANSFERRED RECORDS (OUTPATIENT)
Dept: HEALTH INFORMATION MANAGEMENT | Facility: CLINIC | Age: 53
End: 2018-01-11

## 2018-02-19 ENCOUNTER — TRANSFERRED RECORDS (OUTPATIENT)
Dept: HEALTH INFORMATION MANAGEMENT | Facility: CLINIC | Age: 53
End: 2018-02-19

## 2018-03-29 ENCOUNTER — SURGERY (OUTPATIENT)
Age: 53
End: 2018-03-29

## 2018-03-29 ENCOUNTER — HOSPITAL ENCOUNTER (OUTPATIENT)
Facility: CLINIC | Age: 53
Discharge: HOME | End: 2018-03-29
Attending: INTERNAL MEDICINE | Admitting: INTERNAL MEDICINE
Payer: COMMERCIAL

## 2018-03-29 ENCOUNTER — ANESTHESIA (OUTPATIENT)
Dept: SURGERY | Facility: CLINIC | Age: 53
End: 2018-03-29
Payer: COMMERCIAL

## 2018-03-29 ENCOUNTER — APPOINTMENT (OUTPATIENT)
Dept: GENERAL RADIOLOGY | Facility: CLINIC | Age: 53
End: 2018-03-29
Attending: INTERNAL MEDICINE
Payer: COMMERCIAL

## 2018-03-29 ENCOUNTER — ANESTHESIA EVENT (OUTPATIENT)
Dept: SURGERY | Facility: CLINIC | Age: 53
End: 2018-03-29
Payer: COMMERCIAL

## 2018-03-29 VITALS
BODY MASS INDEX: 28.86 KG/M2 | OXYGEN SATURATION: 97 % | HEART RATE: 73 BPM | TEMPERATURE: 97.7 F | HEIGHT: 60 IN | DIASTOLIC BLOOD PRESSURE: 83 MMHG | RESPIRATION RATE: 16 BRPM | WEIGHT: 147 LBS | SYSTOLIC BLOOD PRESSURE: 146 MMHG

## 2018-03-29 LAB
ERCP: NORMAL
PLATELET # BLD AUTO: 252 10E9/L (ref 150–450)

## 2018-03-29 PROCEDURE — 36000052 ZZH SURGERY LEVEL 2 EA 15 ADDTL MIN: Performed by: INTERNAL MEDICINE

## 2018-03-29 PROCEDURE — 71000012 ZZH RECOVERY PHASE 1 LEVEL 1 FIRST HR: Performed by: INTERNAL MEDICINE

## 2018-03-29 PROCEDURE — 37000008 ZZH ANESTHESIA TECHNICAL FEE, 1ST 30 MIN: Performed by: INTERNAL MEDICINE

## 2018-03-29 PROCEDURE — 36415 COLL VENOUS BLD VENIPUNCTURE: CPT | Performed by: INTERNAL MEDICINE

## 2018-03-29 PROCEDURE — 74330 X-RAY BILE/PANC ENDOSCOPY: CPT

## 2018-03-29 PROCEDURE — 27210286 ZZH BALLOON ADDITIONAL: Performed by: INTERNAL MEDICINE

## 2018-03-29 PROCEDURE — 40000170 ZZH STATISTIC PRE-PROCEDURE ASSESSMENT II: Performed by: INTERNAL MEDICINE

## 2018-03-29 PROCEDURE — 36000050 ZZH SURGERY LEVEL 2 1ST 30 MIN: Performed by: INTERNAL MEDICINE

## 2018-03-29 PROCEDURE — 71000027 ZZH RECOVERY PHASE 2 EACH 15 MINS: Performed by: INTERNAL MEDICINE

## 2018-03-29 PROCEDURE — 37000009 ZZH ANESTHESIA TECHNICAL FEE, EACH ADDTL 15 MIN: Performed by: INTERNAL MEDICINE

## 2018-03-29 PROCEDURE — 85049 AUTOMATED PLATELET COUNT: CPT | Performed by: INTERNAL MEDICINE

## 2018-03-29 PROCEDURE — 25000125 ZZHC RX 250: Performed by: NURSE ANESTHETIST, CERTIFIED REGISTERED

## 2018-03-29 PROCEDURE — 25000128 H RX IP 250 OP 636: Performed by: NURSE ANESTHETIST, CERTIFIED REGISTERED

## 2018-03-29 PROCEDURE — 25000128 H RX IP 250 OP 636: Performed by: ANESTHESIOLOGY

## 2018-03-29 RX ORDER — NALOXONE HYDROCHLORIDE 0.4 MG/ML
.1-.4 INJECTION, SOLUTION INTRAMUSCULAR; INTRAVENOUS; SUBCUTANEOUS
Status: DISCONTINUED | OUTPATIENT
Start: 2018-03-29 | End: 2018-03-29 | Stop reason: HOSPADM

## 2018-03-29 RX ORDER — HYDROMORPHONE HYDROCHLORIDE 1 MG/ML
.3-.5 INJECTION, SOLUTION INTRAMUSCULAR; INTRAVENOUS; SUBCUTANEOUS EVERY 10 MIN PRN
Status: DISCONTINUED | OUTPATIENT
Start: 2018-03-29 | End: 2018-03-29 | Stop reason: HOSPADM

## 2018-03-29 RX ORDER — ONDANSETRON 4 MG/1
4 TABLET, ORALLY DISINTEGRATING ORAL EVERY 30 MIN PRN
Status: DISCONTINUED | OUTPATIENT
Start: 2018-03-29 | End: 2018-03-29 | Stop reason: HOSPADM

## 2018-03-29 RX ORDER — FENTANYL CITRATE 50 UG/ML
INJECTION, SOLUTION INTRAMUSCULAR; INTRAVENOUS PRN
Status: DISCONTINUED | OUTPATIENT
Start: 2018-03-29 | End: 2018-03-29

## 2018-03-29 RX ORDER — DEXAMETHASONE SODIUM PHOSPHATE 4 MG/ML
INJECTION, SOLUTION INTRA-ARTICULAR; INTRALESIONAL; INTRAMUSCULAR; INTRAVENOUS; SOFT TISSUE PRN
Status: DISCONTINUED | OUTPATIENT
Start: 2018-03-29 | End: 2018-03-29

## 2018-03-29 RX ORDER — ONDANSETRON 2 MG/ML
INJECTION INTRAMUSCULAR; INTRAVENOUS PRN
Status: DISCONTINUED | OUTPATIENT
Start: 2018-03-29 | End: 2018-03-29

## 2018-03-29 RX ORDER — SODIUM CHLORIDE, SODIUM LACTATE, POTASSIUM CHLORIDE, CALCIUM CHLORIDE 600; 310; 30; 20 MG/100ML; MG/100ML; MG/100ML; MG/100ML
INJECTION, SOLUTION INTRAVENOUS CONTINUOUS
Status: DISCONTINUED | OUTPATIENT
Start: 2018-03-29 | End: 2018-03-29 | Stop reason: HOSPADM

## 2018-03-29 RX ORDER — SODIUM CHLORIDE, SODIUM LACTATE, POTASSIUM CHLORIDE, CALCIUM CHLORIDE 600; 310; 30; 20 MG/100ML; MG/100ML; MG/100ML; MG/100ML
INJECTION, SOLUTION INTRAVENOUS CONTINUOUS PRN
Status: DISCONTINUED | OUTPATIENT
Start: 2018-03-29 | End: 2018-03-29

## 2018-03-29 RX ORDER — LIDOCAINE 40 MG/G
CREAM TOPICAL
Status: DISCONTINUED | OUTPATIENT
Start: 2018-03-29 | End: 2018-03-29 | Stop reason: HOSPADM

## 2018-03-29 RX ORDER — PROPOFOL 10 MG/ML
INJECTION, EMULSION INTRAVENOUS PRN
Status: DISCONTINUED | OUTPATIENT
Start: 2018-03-29 | End: 2018-03-29

## 2018-03-29 RX ORDER — ONDANSETRON 2 MG/ML
4 INJECTION INTRAMUSCULAR; INTRAVENOUS EVERY 30 MIN PRN
Status: DISCONTINUED | OUTPATIENT
Start: 2018-03-29 | End: 2018-03-29 | Stop reason: HOSPADM

## 2018-03-29 RX ORDER — INDOMETHACIN 50 MG/1
100 SUPPOSITORY RECTAL
Status: DISCONTINUED | OUTPATIENT
Start: 2018-03-29 | End: 2018-03-29 | Stop reason: HOSPADM

## 2018-03-29 RX ORDER — LIDOCAINE HYDROCHLORIDE 20 MG/ML
INJECTION, SOLUTION INFILTRATION; PERINEURAL PRN
Status: DISCONTINUED | OUTPATIENT
Start: 2018-03-29 | End: 2018-03-29

## 2018-03-29 RX ORDER — FENTANYL CITRATE 50 UG/ML
25-50 INJECTION, SOLUTION INTRAMUSCULAR; INTRAVENOUS
Status: DISCONTINUED | OUTPATIENT
Start: 2018-03-29 | End: 2018-03-29 | Stop reason: HOSPADM

## 2018-03-29 RX ORDER — MEPERIDINE HYDROCHLORIDE 25 MG/ML
12.5 INJECTION INTRAMUSCULAR; INTRAVENOUS; SUBCUTANEOUS
Status: DISCONTINUED | OUTPATIENT
Start: 2018-03-29 | End: 2018-03-29 | Stop reason: HOSPADM

## 2018-03-29 RX ORDER — FLUMAZENIL 0.1 MG/ML
0.2 INJECTION, SOLUTION INTRAVENOUS
Status: DISCONTINUED | OUTPATIENT
Start: 2018-03-29 | End: 2018-03-29 | Stop reason: HOSPADM

## 2018-03-29 RX ORDER — PROPOFOL 10 MG/ML
INJECTION, EMULSION INTRAVENOUS CONTINUOUS PRN
Status: DISCONTINUED | OUTPATIENT
Start: 2018-03-29 | End: 2018-03-29

## 2018-03-29 RX ADMIN — FENTANYL CITRATE 25 MCG: 50 INJECTION, SOLUTION INTRAMUSCULAR; INTRAVENOUS at 08:20

## 2018-03-29 RX ADMIN — MIDAZOLAM 2 MG: 1 INJECTION INTRAMUSCULAR; INTRAVENOUS at 08:16

## 2018-03-29 RX ADMIN — ONDANSETRON 4 MG: 2 INJECTION INTRAMUSCULAR; INTRAVENOUS at 08:28

## 2018-03-29 RX ADMIN — PROPOFOL 135 MCG/KG/MIN: 10 INJECTION, EMULSION INTRAVENOUS at 08:20

## 2018-03-29 RX ADMIN — FENTANYL CITRATE 25 MCG: 50 INJECTION, SOLUTION INTRAMUSCULAR; INTRAVENOUS at 08:39

## 2018-03-29 RX ADMIN — LIDOCAINE HYDROCHLORIDE 40 MG: 20 INJECTION, SOLUTION INFILTRATION; PERINEURAL at 08:20

## 2018-03-29 RX ADMIN — DEXMEDETOMIDINE HYDROCHLORIDE 8 MCG: 100 INJECTION, SOLUTION INTRAVENOUS at 08:38

## 2018-03-29 RX ADMIN — PROPOFOL 10 MG: 10 INJECTION, EMULSION INTRAVENOUS at 08:20

## 2018-03-29 RX ADMIN — PHENYLEPHRINE HYDROCHLORIDE 50 MCG: 10 INJECTION INTRAVENOUS at 08:36

## 2018-03-29 RX ADMIN — FENTANYL CITRATE 25 MCG: 50 INJECTION INTRAMUSCULAR; INTRAVENOUS at 09:33

## 2018-03-29 RX ADMIN — FENTANYL CITRATE 25 MCG: 50 INJECTION INTRAMUSCULAR; INTRAVENOUS at 09:25

## 2018-03-29 RX ADMIN — DEXAMETHASONE SODIUM PHOSPHATE 4 MG: 4 INJECTION, SOLUTION INTRA-ARTICULAR; INTRALESIONAL; INTRAMUSCULAR; INTRAVENOUS; SOFT TISSUE at 08:28

## 2018-03-29 RX ADMIN — SODIUM CHLORIDE, POTASSIUM CHLORIDE, SODIUM LACTATE AND CALCIUM CHLORIDE: 600; 310; 30; 20 INJECTION, SOLUTION INTRAVENOUS at 08:16

## 2018-03-29 NOTE — OR NURSING
ERCP with stent removal and stone extraction. Sedation and monitoring per anesthesia. No specimens obtained

## 2018-03-29 NOTE — IP AVS SNAPSHOT
[place] [address]    [place] [address]    Phone:  [phone]                                       After Visit Summary   3/29/2018    Humera Grey    MRN: [id number]           After Visit Summary Signature Page     I have received my discharge instructions, and my questions have been answered. I have discussed any challenges I see with this plan with the nurse or doctor.    ..........................................................................................................................................  Patient/Patient Representative Signature      ..........................................................................................................................................  Patient Representative Print Name and Relationship to Patient    ..................................................               ................................................  Date                                            Time    ..........................................................................................................................................  Reviewed by Signature/Title    ...................................................              ..............................................  Date                                                            Time

## 2018-03-29 NOTE — ANESTHESIA CARE TRANSFER NOTE
Patient: Humera Grey    Procedure(s):  ENDOSCOPIC RETROGRADE CHOLANGIOPANCREATOGRAME WITH STENT REMOVAL  - Wound Class: II-Clean Contaminated    Diagnosis: stone in common bile duct  Diagnosis Additional Information: No value filed.    Anesthesia Type:   MAC     Note:  Airway :Nasal Cannula  Patient transferred to:PACU  Comments: Pt to PACU with O2 via nasal cannula, airway patent, VSS.  Report to RN.Handoff Report: Identifed the Patient, Identified the Reponsible Provider, Reviewed the pertinent medical history, Discussed the surgical course, Reviewed Intra-OP anesthesia mangement and issues during anesthesia, Set expectations for post-procedure period and Allowed opportunity for questions and acknowledgement of understanding      Vitals: (Last set prior to Anesthesia Care Transfer)    CRNA VITALS  3/29/2018 0822 - 3/29/2018 0857      3/29/2018             Resp Rate (set): 10                Electronically Signed By: ALLA Calixto CRNA  March 29, 2018  8:57 AM

## 2018-03-29 NOTE — ANESTHESIA POSTPROCEDURE EVALUATION
Patient: Humera Grey    Procedure(s):  ENDOSCOPIC RETROGRADE CHOLANGIOPANCREATOGRAME WITH STENT REMOVAL  - Wound Class: II-Clean Contaminated    Diagnosis:stone in common bile duct  Diagnosis Additional Information: No value filed.    Anesthesia Type:  MAC    Note:  Anesthesia Post Evaluation    Patient location during evaluation: PACU  Patient participation: Able to fully participate in evaluation  Level of consciousness: awake  Pain management: adequate  Airway patency: patent  Cardiovascular status: acceptable  Respiratory status: acceptable  Hydration status: acceptable  PONV: none     Anesthetic complications: None          Last vitals:  Vitals:    03/29/18 0945 03/29/18 1000 03/29/18 1032   BP: 141/82 147/82 146/83   Pulse:      Resp: 10 16 16   Temp: 36.6  C (97.9  F) 36.5  C (97.7  F)    SpO2: 97% 95% 97%         Electronically Signed By: Chastity Palafox MD  March 29, 2018  12:24 PM

## 2018-03-29 NOTE — ANESTHESIA PREPROCEDURE EVALUATION
Anesthesia Evaluation     . Pt has had prior anesthetic.     No history of anesthetic complications          ROS/MED HX    ENT/Pulmonary:      (-) sleep apnea   Neurologic:       Cardiovascular:  - neg cardiovascular ROS       METS/Exercise Tolerance:  >4 METS   Hematologic:         Musculoskeletal:         GI/Hepatic:     (+) GERD Asymptomatic on medication, cholecystitis/cholelithiasis,       Renal/Genitourinary:         Endo:         Psychiatric:     (+) psychiatric history anxiety and depression      Infectious Disease:         Malignancy:   (+) Malignancy History of Breast  Breast CA Remission status post.         Other:                     Physical Exam  Normal systems: dental    Airway   Mallampati: II  TM distance: >3 FB  Neck ROM: full    Dental     Cardiovascular   Rhythm and rate: regular and normal      Pulmonary    breath sounds clear to auscultation                    Anesthesia Plan      History & Physical Review  History and physical reviewed and following examination; no interval change.    ASA Status:  2 .    NPO Status:  > 8 hours    Plan for MAC Reason for MAC:  Deep or markedly invasive procedure (G8)  PONV prophylaxis:  Ondansetron (or other 5HT-3) and Dexamethasone or Solumedrol  Precedex/versed/fentanyl last time      Postoperative Care      Consents  Anesthetic plan, risks, benefits and alternatives discussed with:  Patient..                          .

## 2018-03-29 NOTE — OR NURSING
Dr. Leonard at bedside. Will put dc order in EPIC.  States no pain rx for home.  Patient may take ibuprofen at home.

## 2018-03-29 NOTE — IP AVS SNAPSHOT
MRN:[id number]                      After Visit Summary   3/29/2018    Humera Grey    MRN: [id number]           Thank you!     Thank you for choosing [facility] for your care. Our goal is always to provide you with excellent care. Hearing back from our patients is one way we can continue to improve our services. Please take a few minutes to complete the written survey that you may receive in the mail after you visit with us. Thank you!        Patient Information     Date Of Birth          1965        About your hospital stay     You were admitted on:  March 29, 2018 You last received care in the:  [facility] PACU    You were discharged on:  March 29, 2018       Who to Call     For medical emergencies, please call 911.  For non-urgent questions about your medical care, please call your primary care provider or clinic, [phone]  For questions related to your surgery, please call your surgery clinic        Attending Provider     Provider Specialty    Usman Leonard MD Gastroenterology       Primary Care Provider Office Phone # Fax #    Majo Velez [phone]      Further instructions from your care team         Same Day Surgery Discharge Instructions for  Sedation and General Anesthesia       It's not unusual to feel dizzy, light-headed or faint for up to 24 hours after surgery or while taking pain medication.  If you have these symptoms: sit for a few minutes before standing and have someone assist you when you get up to walk or use the bathroom.      You should rest and relax for the next 24 hours. We recommend you make arrangements to have an adult stay with you for at least 24 hours after your discharge.  Avoid hazardous and strenuous activity.      DO NOT DRIVE any vehicle or operate mechanical equipment for 24 hours following the end of your surgery.  Even though you may feel normal, your reactions may be affected by the medication you have  received.      Do not drink alcoholic beverages for 24 hours following surgery.       Slowly progress to your regular diet as you feel able. It's not unusual to feel nauseated and/or vomit after receiving anesthesia.  If you develop these symptoms, drink clear liquids (apple juice, ginger ale, broth, 7-up, etc. ) until you feel better.  If your nausea and vomiting persists for 24 hours, please notify your surgeon.        All narcotic pain medications, along with inactivity and anesthesia, can cause constipation. Drinking plenty of liquids and increasing fiber intake will help.      For any questions of a medical nature, call your surgeon.      Do not make important decisions for 24 hours.      If you had general anesthesia, you may have a sore throat for a couple of days related to the breathing tube used during surgery.  You may use Cepacol lozenges to help with this discomfort.  If it worsens or if you develop a fever, contact your surgeon.       If you feel your pain is not well managed with the pain medications prescribed by your surgeon, please contact your surgeon's office to let them know so they can address your concerns.       **If you have questions or concerns about your procedure, call   Dr. Leonard at [phone].**          Pending Results     Date and Time Order Name Status Description    3/29/2018 0858 XR ERCP In process             Admission Information     Date & Time Provider Department Dept. Phone    3/29/2018 Usman Leonard MD [facility] PACU [phone]      Your Vitals Were     Blood Pressure Pulse Temperature Respirations Height Weight    141/82 73 97.9  F (36.6  C) 10 1.524 m (5') 66.7 kg (147 lb)    Pulse Oximetry BMI (Body Mass Index)                97% 28.71 kg/m2          MyChart Information     [facility] lets you send messages to your doctor, view your test results, renew your prescriptions, schedule appointments and more. To sign up, go to [email or web address]  "Click on \"Log in\" on the left side of the screen, which will take you to the Welcome page. Then click on \"Sign up Now\" on the right side of the page.     You will be asked to enter the access code listed below, as well as some personal information. Please follow the directions to create your username and password.     Your access code is: [email or web address]  Expires: 2018  9:28 AM     Your access code will  in 90 days. If you need help or a new code, please call your [facility] clinic or [phone].        Care EveryWhere ID     This is your Care EveryWhere ID. This could be used by other organizations to access your [facility] medical records  [id number]        Equal Access to Services     DEREK CASTILLO : Shasha Avilez, wajanelle peng, qaybgab kaalmashekhar rayo, loyda patton . So [facility] [phone].    ATENCIÓN: Si habla español, tiene a dye disposición servicios gratuitos de asistencia lingüística. Llame al [phone].    We comply with applicable federal civil rights laws and [place] laws. We do not discriminate on the basis of race, color, national origin, age, disability, sex, sexual orientation, or gender identity.               Review of your medicines      UNREVIEWED medicines. Ask your doctor about these medicines        Dose / Directions    ADVIL PO        Dose:  200-400 mg   Take 200-400 mg by mouth every 6 hours as needed for moderate pain   Refills:  0       oxyCODONE IR 5 MG tablet   Commonly known as:  ROXICODONE   Used for:  Choledocholithiasis with acute cholecystitis        Dose:  5-10 mg   Take 1-2 tablets (5-10 mg) by mouth every 3 hours as needed for moderate to severe pain   Quantity:  20 tablet   Refills:  0       PROTONIX PO        Refills:  0       ZANTAC PO        Dose:  150 mg   Take 150 mg by mouth as needed for heartburn   Refills:  0                Protect others around you: Learn how to safely use, store and throw away your medicines at " www.disposemymeds.org.             Medication List: This is a list of all your medications and when to take them. Check marks below indicate your daily home schedule. Keep this list as a reference.      Medications           Morning Afternoon Evening Bedtime As Needed    ADVIL PO   Take 200-400 mg by mouth every 6 hours as needed for moderate pain                                oxyCODONE IR 5 MG tablet   Commonly known as:  ROXICODONE   Take 1-2 tablets (5-10 mg) by mouth every 3 hours as needed for moderate to severe pain                                PROTONIX PO                                ZANTAC PO   Take 150 mg by mouth as needed for heartburn

## 2018-03-29 NOTE — H&P
[facility]  Pre-Endoscopy History and Physical     Humera Grey MRN# [id number]   YOB: 1965 Age: 52 year old     Date of Procedure: 3/29/2018  Primary care provider: Majo Velez  Type of Endoscopy: Endoscopic Retrograde Cholangiopancreatography (ERCP)  Reason for Procedure: Stent removal  Type of Anesthesia Anticipated: MAC    HPI:    Humera is a 52 year old female who will be undergoing the above procedure.      A history and physical has been performed. The patient's medications and allergies have been reviewed. The risks and benefits of the procedure and the sedation options and risks were discussed with the patient.  All questions were answered and informed consent was obtained.      She denies a personal or family history of anesthesia complications or bleeding disorders.     Allergies   Allergen Reactions     Bupropion Hives     Contrast Dye Hives     Per patient report     Escitalopram Hives     Sertraline      Morphine Hives     Possible allergy     Venlafaxine Rash        Prior to Admission Medications   Prescriptions Last Dose Informant Patient Reported? Taking?   Ibuprofen (ADVIL PO) Past Week at Unknown time Self Yes Yes   Sig: Take 200-400 mg by mouth every 6 hours as needed for moderate pain   Pantoprazole Sodium (PROTONIX PO) Past Month at Unknown time  Yes Yes   RaNITidine HCl (ZANTAC PO) Past Week at Unknown time  Yes Yes   Sig: Take 150 mg by mouth as needed for heartburn   oxyCODONE IR (ROXICODONE) 5 MG tablet Past Week at Unknown time  No Yes   Sig: Take 1-2 tablets (5-10 mg) by mouth every 3 hours as needed for moderate to severe pain      Facility-Administered Medications: None       Patient Active Problem List   Diagnosis     History of cervical dysplasia     History of breast cancer     Cholecystitis        Past Medical History:   Diagnosis Date     Abdominal pain      Breast cancer (H)      Depressive disorder      Gastroesophageal reflux disease          Past Surgical History:   Procedure Laterality Date     BREAST SURGERY       CYSTECTOMY OVARIAN BENIGN       ENDOSCOPIC RETROGRADE CHOLANGIOPANCREATOGRAM N/A 12/20/2017    Procedure: COMBINED ENDOSCOPIC RETROGRADE CHOLANGIOPANCREATOGRAPHY, REMOVE STONE/BALLOON;  ENDOSCOPIC RETROGRADE CHOLANGIOPANCREATOGRAM, STONE REMOVAL, SPHINCTEROTOMY AND BILE DUCT STENT PLACEMENT  ;  Surgeon: Usman Leonard MD;  Location:  OR     ENDOSCOPIC RETROGRADE CHOLANGIOPANCREATOGRAM N/A 12/20/2017    Procedure: COMBINED ENDOSCOPIC RETROGRADE CHOLANGIOPANCREATOGRAPHY, SPHINCTEROTOMY;;  Surgeon: Usman Leonard MD;  Location:  OR     ENDOSCOPIC RETROGRADE CHOLANGIOPANCREATOGRAM N/A 12/20/2017    Procedure: COMBINED ENDOSCOPIC RETROGRADE CHOLANGIOPANCREATOGRAPHY, PLACE TUBE/STENT;;  Surgeon: Usman Leonard MD;  Location:  OR     LAPAROSCOPIC CHOLECYSTECTOMY N/A 12/21/2017    Procedure: LAPAROSCOPIC CHOLECYSTECTOMY;  LAPAROSCOPIC CHOLECYSTECTOMY ;  Surgeon: Denilson Brenner MD;  Location:  OR     MASTECTOMY       ORTHOPEDIC SURGERY      [facility]       Social History   Substance Use Topics     Smoking status: Former Smoker     Smokeless tobacco: Never Used     Alcohol use Yes      Comment: 5 times a week       No family history on file.    REVIEW OF SYSTEMS:     5 point ROS negative except as noted above in HPI, including Gen., Resp., CV, GI &  system review.      PHYSICAL EXAM:   There were no vitals taken for this visit. Estimated body mass index is 26.95 kg/(m^2) as calculated from the following:    Height as of 12/19/17: 1.524 m (5').    Weight as of 12/19/17: 62.6 kg (138 lb).   GENERAL APPEARANCE: healthy, alert and no distress  MENTAL STATUS: alert  AIRWAY EXAM: Mallampatti Class I (visualization of the soft palate, fauces, uvula, anterior and posterior pillars)  RESP: lungs clear to auscultation - no rales, rhonchi or wheezes  CV: normal S1 S2, no S3 or S4      DIAGNOSTICS:    Not  indicated      IMPRESSION   ASA Class 1 - Healthy patient, no medical problems        PLAN:       Plan for Endoscopic Retrograde Cholangiopancreatography (ERCP). We discussed the risks, benefits and alternatives and the patient wished to proceed.    The above has been forwarded to the consulting provider.      Signed Electronically by: Usman Leonard MD  March 29, 2018

## 2018-03-29 NOTE — DISCHARGE INSTRUCTIONS
Same Day Surgery Discharge Instructions for  Sedation and General Anesthesia       It's not unusual to feel dizzy, light-headed or faint for up to 24 hours after surgery or while taking pain medication.  If you have these symptoms: sit for a few minutes before standing and have someone assist you when you get up to walk or use the bathroom.      You should rest and relax for the next 24 hours. We recommend you make arrangements to have an adult stay with you for at least 24 hours after your discharge.  Avoid hazardous and strenuous activity.      DO NOT DRIVE any vehicle or operate mechanical equipment for 24 hours following the end of your surgery.  Even though you may feel normal, your reactions may be affected by the medication you have received.      Do not drink alcoholic beverages for 24 hours following surgery.       Slowly progress to your regular diet as you feel able. It's not unusual to feel nauseated and/or vomit after receiving anesthesia.  If you develop these symptoms, drink clear liquids (apple juice, ginger ale, broth, 7-up, etc. ) until you feel better.  If your nausea and vomiting persists for 24 hours, please notify your surgeon.        All narcotic pain medications, along with inactivity and anesthesia, can cause constipation. Drinking plenty of liquids and increasing fiber intake will help.      For any questions of a medical nature, call your surgeon.      Do not make important decisions for 24 hours.      If you had general anesthesia, you may have a sore throat for a couple of days related to the breathing tube used during surgery.  You may use Cepacol lozenges to help with this discomfort.  If it worsens or if you develop a fever, contact your surgeon.       If you feel your pain is not well managed with the pain medications prescribed by your surgeon, please contact your surgeon's office to let them know so they can address your concerns.       **If you have questions or concerns about  your procedure, call   Dr. Leonard at [phone].**

## (undated) DEVICE — SOL NACL 0.9% INJ 1000ML BAG 2B1324X

## (undated) DEVICE — SU VICRYL 4-0 PS-2 18" UND J496H

## (undated) DEVICE — ENDO TROCAR OPTICAL 05MM VERSAPORT PLUS W/FIX CAN ONB5STF

## (undated) DEVICE — SUCTION CANISTER MEDIVAC LINER 3000ML W/LID 65651-530

## (undated) DEVICE — ENDO TROCAR OPTICAL 12MM VERSAONE W/STD FIX CAN UNVCA12STF

## (undated) DEVICE — CLIP APPLIER ENDO 05MM MED/LG 176630

## (undated) DEVICE — ENDO POUCH REIACATCH 2.44" 10MM CATCH10

## (undated) DEVICE — RAD RX ISOVUE 300 (50ML) 61% IOPAMIDOL CHARGE PER ML

## (undated) DEVICE — STPL ENDO RELOAD 30X2.5MM ROTIC 030451

## (undated) DEVICE — GLOVE PROTEXIS BLUE W/NEU-THERA 7.5  2D73EB75

## (undated) DEVICE — PACK LAP CHOLE SLC15LCFSD

## (undated) DEVICE — ENDO TROCAR FIRST ENTRY KII FIOS Z-THRD 05X100MM CTF03

## (undated) DEVICE — ESU HOLDER LAP INST DISP PURPLE LONG 330MM H-PRO-330

## (undated) DEVICE — ESU CORD MONOPOLAR 10'  E0510

## (undated) DEVICE — ENDO TROCAR OPTICAL 11MM VERSAPORT PLUS W/FIX CAN ONB11STF

## (undated) DEVICE — ENDO CANNULA 05MM VERSAONE UNIVERSAL UNVCA5STF

## (undated) DEVICE — ESU GROUND PAD UNIVERSAL W/O CORD

## (undated) DEVICE — SU VICRYL 0 UR-6 27" J603H

## (undated) DEVICE — SUCTION IRR STRYKERFLOW II W/TIP 250-070-520

## (undated) DEVICE — LINEN TOWEL PACK X5 5464

## (undated) DEVICE — PREP CHLORAPREP 26ML TINTED ORANGE  260815

## (undated) DEVICE — STPL ENDO HANDLE GIA ULTRA UNIVERSAL STD EGIAUSTND

## (undated) DEVICE — GLOVE PROTEXIS W/NEU-THERA 7.5  2D73TE75

## (undated) DEVICE — SURGICEL FIBRILLAR HEMOSTAT 1"X2" 1961

## (undated) RX ORDER — ONDANSETRON 2 MG/ML
INJECTION INTRAMUSCULAR; INTRAVENOUS
Status: DISPENSED
Start: 2017-12-20

## (undated) RX ORDER — FENTANYL CITRATE 50 UG/ML
INJECTION, SOLUTION INTRAMUSCULAR; INTRAVENOUS
Status: DISPENSED
Start: 2017-12-20

## (undated) RX ORDER — FENTANYL CITRATE 50 UG/ML
INJECTION, SOLUTION INTRAMUSCULAR; INTRAVENOUS
Status: DISPENSED
Start: 2017-12-21

## (undated) RX ORDER — FENTANYL CITRATE 50 UG/ML
INJECTION, SOLUTION INTRAMUSCULAR; INTRAVENOUS
Status: DISPENSED
Start: 2018-03-29

## (undated) RX ORDER — LIDOCAINE HYDROCHLORIDE 20 MG/ML
INJECTION, SOLUTION EPIDURAL; INFILTRATION; INTRACAUDAL; PERINEURAL
Status: DISPENSED
Start: 2017-12-21

## (undated) RX ORDER — LIDOCAINE HYDROCHLORIDE 20 MG/ML
INJECTION, SOLUTION EPIDURAL; INFILTRATION; INTRACAUDAL; PERINEURAL
Status: DISPENSED
Start: 2018-03-29

## (undated) RX ORDER — HYDROMORPHONE HYDROCHLORIDE 1 MG/ML
INJECTION, SOLUTION INTRAMUSCULAR; INTRAVENOUS; SUBCUTANEOUS
Status: DISPENSED
Start: 2017-12-20

## (undated) RX ORDER — DEXAMETHASONE SODIUM PHOSPHATE 4 MG/ML
INJECTION, SOLUTION INTRA-ARTICULAR; INTRALESIONAL; INTRAMUSCULAR; INTRAVENOUS; SOFT TISSUE
Status: DISPENSED
Start: 2017-12-21

## (undated) RX ORDER — ONDANSETRON 2 MG/ML
INJECTION INTRAMUSCULAR; INTRAVENOUS
Status: DISPENSED
Start: 2018-03-29

## (undated) RX ORDER — LIDOCAINE HYDROCHLORIDE 10 MG/ML
INJECTION, SOLUTION EPIDURAL; INFILTRATION; INTRACAUDAL; PERINEURAL
Status: DISPENSED
Start: 2018-03-29

## (undated) RX ORDER — LIDOCAINE HYDROCHLORIDE 20 MG/ML
INJECTION, SOLUTION EPIDURAL; INFILTRATION; INTRACAUDAL; PERINEURAL
Status: DISPENSED
Start: 2017-12-20

## (undated) RX ORDER — LABETALOL HYDROCHLORIDE 5 MG/ML
INJECTION, SOLUTION INTRAVENOUS
Status: DISPENSED
Start: 2017-12-21

## (undated) RX ORDER — GLYCOPYRROLATE 0.2 MG/ML
INJECTION, SOLUTION INTRAMUSCULAR; INTRAVENOUS
Status: DISPENSED
Start: 2017-12-21

## (undated) RX ORDER — PROPOFOL 10 MG/ML
INJECTION, EMULSION INTRAVENOUS
Status: DISPENSED
Start: 2017-12-21

## (undated) RX ORDER — BUPIVACAINE HYDROCHLORIDE 2.5 MG/ML
INJECTION, SOLUTION EPIDURAL; INFILTRATION; INTRACAUDAL
Status: DISPENSED
Start: 2017-12-21

## (undated) RX ORDER — HYDROMORPHONE HYDROCHLORIDE 1 MG/ML
INJECTION, SOLUTION INTRAMUSCULAR; INTRAVENOUS; SUBCUTANEOUS
Status: DISPENSED
Start: 2017-12-21

## (undated) RX ORDER — ONDANSETRON 2 MG/ML
INJECTION INTRAMUSCULAR; INTRAVENOUS
Status: DISPENSED
Start: 2017-12-21

## (undated) RX ORDER — DEXAMETHASONE SODIUM PHOSPHATE 4 MG/ML
INJECTION, SOLUTION INTRA-ARTICULAR; INTRALESIONAL; INTRAMUSCULAR; INTRAVENOUS; SOFT TISSUE
Status: DISPENSED
Start: 2018-03-29

## (undated) RX ORDER — PROPOFOL 10 MG/ML
INJECTION, EMULSION INTRAVENOUS
Status: DISPENSED
Start: 2017-12-20

## (undated) RX ORDER — CEFAZOLIN SODIUM 2 G/100ML
INJECTION, SOLUTION INTRAVENOUS
Status: DISPENSED
Start: 2017-12-21

## (undated) RX ORDER — DEXAMETHASONE SODIUM PHOSPHATE 4 MG/ML
INJECTION, SOLUTION INTRA-ARTICULAR; INTRALESIONAL; INTRAMUSCULAR; INTRAVENOUS; SOFT TISSUE
Status: DISPENSED
Start: 2017-12-20

## (undated) RX ORDER — PROPOFOL 10 MG/ML
INJECTION, EMULSION INTRAVENOUS
Status: DISPENSED
Start: 2018-03-29